# Patient Record
Sex: FEMALE | Race: WHITE | NOT HISPANIC OR LATINO | Employment: OTHER | URBAN - METROPOLITAN AREA
[De-identification: names, ages, dates, MRNs, and addresses within clinical notes are randomized per-mention and may not be internally consistent; named-entity substitution may affect disease eponyms.]

---

## 2023-09-20 PROBLEM — J20.9 BRONCHITIS, ACUTE: Status: RESOLVED | Noted: 2023-09-20 | Resolved: 2023-09-20

## 2023-09-20 PROBLEM — J44.9 COPD (CHRONIC OBSTRUCTIVE PULMONARY DISEASE) (MULTI): Status: ACTIVE | Noted: 2023-09-20

## 2023-09-20 PROBLEM — N76.0 ACUTE VAGINITIS: Status: RESOLVED | Noted: 2023-09-20 | Resolved: 2023-09-20

## 2023-09-20 PROBLEM — J44.1 COPD EXACERBATION (MULTI): Status: ACTIVE | Noted: 2023-09-20

## 2023-09-20 PROBLEM — R91.8 LUNG NODULES: Status: ACTIVE | Noted: 2023-09-20

## 2023-09-20 PROBLEM — R53.81 MALAISE AND FATIGUE: Status: ACTIVE | Noted: 2023-09-20

## 2023-09-20 PROBLEM — N95.1 MENOPAUSAL SYMPTOMS: Status: ACTIVE | Noted: 2023-09-20

## 2023-09-20 PROBLEM — H10.33 ACUTE BACTERIAL CONJUNCTIVITIS OF BOTH EYES: Status: RESOLVED | Noted: 2023-09-20 | Resolved: 2023-09-20

## 2023-09-20 PROBLEM — E78.2 COMBINED HYPERLIPIDEMIA: Status: ACTIVE | Noted: 2023-09-20

## 2023-09-20 PROBLEM — T63.481A INSECT STING: Status: RESOLVED | Noted: 2023-09-20 | Resolved: 2023-09-20

## 2023-09-20 PROBLEM — J01.00 ACUTE NON-RECURRENT MAXILLARY SINUSITIS: Status: RESOLVED | Noted: 2023-09-20 | Resolved: 2023-09-20

## 2023-09-20 PROBLEM — R53.83 MALAISE AND FATIGUE: Status: ACTIVE | Noted: 2023-09-20

## 2023-09-20 PROBLEM — H10.13 ALLERGIC CONJUNCTIVITIS OF BOTH EYES: Status: ACTIVE | Noted: 2023-09-20

## 2023-09-20 PROBLEM — F32.1 MODERATE SINGLE CURRENT EPISODE OF MAJOR DEPRESSIVE DISORDER (MULTI): Status: ACTIVE | Noted: 2023-09-20

## 2023-09-20 PROBLEM — M25.561 ACUTE PAIN OF RIGHT KNEE: Status: RESOLVED | Noted: 2023-09-20 | Resolved: 2023-09-20

## 2023-09-20 PROBLEM — L50.8 URTICARIA, ACUTE: Status: RESOLVED | Noted: 2023-09-20 | Resolved: 2023-09-20

## 2023-09-20 RX ORDER — TRAZODONE HYDROCHLORIDE 100 MG/1
TABLET ORAL
COMMUNITY
Start: 2016-08-26

## 2023-09-20 RX ORDER — CITALOPRAM 20 MG/1
20 TABLET, FILM COATED ORAL DAILY
COMMUNITY
Start: 2019-12-10

## 2023-09-20 RX ORDER — NEOMYCIN SULFATE, POLYMYXIN B SULFATE, AND DEXAMETHASONE 3.5; 10000; 1 MG/G; [USP'U]/G; MG/G
OINTMENT OPHTHALMIC
COMMUNITY
Start: 2021-10-29 | End: 2023-09-21 | Stop reason: ALTCHOICE

## 2023-09-20 RX ORDER — CHOLECALCIFEROL (VITAMIN D3) 25 MCG
25 TABLET ORAL DAILY
COMMUNITY
Start: 2022-04-18

## 2023-09-20 RX ORDER — BUDESONIDE AND FORMOTEROL FUMARATE DIHYDRATE 160; 4.5 UG/1; UG/1
2 AEROSOL RESPIRATORY (INHALATION) 2 TIMES DAILY
COMMUNITY
Start: 2021-06-08 | End: 2023-09-21 | Stop reason: SDUPTHER

## 2023-09-20 RX ORDER — IBUPROFEN 100 MG/5ML
SUSPENSION, ORAL (FINAL DOSE FORM) ORAL
COMMUNITY

## 2023-09-20 RX ORDER — BUPROPION HYDROCHLORIDE 100 MG/1
TABLET, EXTENDED RELEASE ORAL DAILY
COMMUNITY
Start: 2023-03-05 | End: 2024-01-03 | Stop reason: WASHOUT

## 2023-09-20 RX ORDER — ALBUTEROL SULFATE 90 UG/1
AEROSOL, METERED RESPIRATORY (INHALATION)
COMMUNITY
Start: 2019-12-26 | End: 2023-09-21 | Stop reason: SDUPTHER

## 2023-09-21 ENCOUNTER — OFFICE VISIT (OUTPATIENT)
Dept: PRIMARY CARE | Facility: CLINIC | Age: 68
End: 2023-09-21
Payer: MEDICARE

## 2023-09-21 VITALS
SYSTOLIC BLOOD PRESSURE: 100 MMHG | OXYGEN SATURATION: 88 % | HEART RATE: 94 BPM | BODY MASS INDEX: 23.73 KG/M2 | TEMPERATURE: 101.7 F | WEIGHT: 125.6 LBS | DIASTOLIC BLOOD PRESSURE: 56 MMHG

## 2023-09-21 DIAGNOSIS — J44.1 COPD EXACERBATION (MULTI): ICD-10-CM

## 2023-09-21 DIAGNOSIS — F32.1 MODERATE SINGLE CURRENT EPISODE OF MAJOR DEPRESSIVE DISORDER (MULTI): ICD-10-CM

## 2023-09-21 PROCEDURE — 99213 OFFICE O/P EST LOW 20 MIN: CPT | Performed by: FAMILY MEDICINE

## 2023-09-21 PROCEDURE — 1160F RVW MEDS BY RX/DR IN RCRD: CPT | Performed by: FAMILY MEDICINE

## 2023-09-21 PROCEDURE — 1159F MED LIST DOCD IN RCRD: CPT | Performed by: FAMILY MEDICINE

## 2023-09-21 RX ORDER — PREDNISONE 10 MG/1
TABLET ORAL
Qty: 14 TABLET | Refills: 0 | Status: SHIPPED | OUTPATIENT
Start: 2023-09-21 | End: 2023-09-29

## 2023-09-21 RX ORDER — CITALOPRAM 40 MG/1
40 TABLET, FILM COATED ORAL DAILY
COMMUNITY

## 2023-09-21 RX ORDER — BUDESONIDE AND FORMOTEROL FUMARATE DIHYDRATE 160; 4.5 UG/1; UG/1
2 AEROSOL RESPIRATORY (INHALATION) 2 TIMES DAILY
Qty: 1 EACH | Refills: 2 | Status: SHIPPED | OUTPATIENT
Start: 2023-09-21

## 2023-09-21 RX ORDER — ALBUTEROL SULFATE 90 UG/1
AEROSOL, METERED RESPIRATORY (INHALATION)
Qty: 18 G | Refills: 2 | Status: SHIPPED | OUTPATIENT
Start: 2023-09-21

## 2023-09-21 RX ORDER — AZITHROMYCIN 250 MG/1
TABLET, FILM COATED ORAL
Qty: 6 TABLET | Refills: 0 | Status: SHIPPED | OUTPATIENT
Start: 2023-09-21 | End: 2023-09-26

## 2023-09-21 RX ORDER — MULTIVITAMIN
1 TABLET ORAL DAILY
COMMUNITY

## 2023-09-21 ASSESSMENT — ENCOUNTER SYMPTOMS
NAUSEA: 0
UNEXPECTED WEIGHT CHANGE: 0
APPETITE CHANGE: 0
SHORTNESS OF BREATH: 1

## 2023-09-21 NOTE — PROGRESS NOTES
Subjective   Patient ID: Rebekah Murillo is a 67 y.o. female who presents for Shortness of Breath (Pt states SOB started in June and hs gotten worse cough and phlegm thick and greenish low grade fever off and on. No sore throat. Pt states she has been weak and smoked last cigarette Sunday night. ).    Shortness of Breath    Patient says she has not smoked in 5 days now and does not intend to restart  No chest pain but does have shortness of breath and is coughing a lot  Green sputum  No fever chills    Review of Systems   Constitutional:  Negative for appetite change and unexpected weight change.   Eyes:  Negative for visual disturbance.   Respiratory:  Positive for shortness of breath.    Gastrointestinal:  Negative for nausea.       Objective   /56   Pulse 94   Temp 38.7 °C (101.7 °F)   Wt 57 kg (125 lb 9.6 oz)   SpO2 (!) 88%   BMI 23.73 kg/m²     Physical Exam  HENT:      Head: Normocephalic and atraumatic.      Nose: Nose normal.      Mouth/Throat:      Mouth: Mucous membranes are moist.      Pharynx: No oropharyngeal exudate.   Eyes:      Extraocular Movements: Extraocular movements intact.      Conjunctiva/sclera: Conjunctivae normal.      Pupils: Pupils are equal, round, and reactive to light.   Cardiovascular:      Rate and Rhythm: Normal rate and regular rhythm.   Pulmonary:      Effort: Pulmonary effort is normal.      Breath sounds: Wheezing and rhonchi present.   Abdominal:      General: There is no distension.      Palpations: Abdomen is soft.   Musculoskeletal:      Cervical back: Normal range of motion and neck supple.   Lymphadenopathy:      Cervical: No cervical adenopathy.   Neurological:      General: No focal deficit present.      Mental Status: She is alert.   Psychiatric:         Attention and Perception: Attention normal.         Speech: Speech normal.         Behavior: Behavior is cooperative.         Assessment/Plan   Diagnoses and all orders for this visit:  COPD exacerbation  (CMS/[facility])  Comments:  Add medication  Discussed smoking cessation  Encouraged her  to quit smoking as well but he was not receptive  Orders:  -     albuterol (Ventolin HFA) 90 mcg/actuation inhaler; INHALE 1 TO 2 PUFFS EVERY 4 TO 6 HOURS AS NEEDED.  -     budesonide-formoteroL (Symbicort) 160-4.5 mcg/actuation inhaler; Inhale 2 puffs 2 times a day.  -     predniSONE (Deltasone) 10 mg tablet; Take 1 tablet (10 mg) by mouth 3 times a day for 2 days, THEN 1 tablet (10 mg) 2 times a day for 2 days, THEN 1 tablet (10 mg) once daily for 4 days.  -     azithromycin (Zithromax) 250 mg tablet; Take 2 tablets (500 mg) by mouth once daily for 1 day, THEN 1 tablet (250 mg) once daily for 4 days.  -     XR chest 2 views; Future  Moderate single current episode of major depressive disorder (CMS/[facility])  Comments:  Treated and controlled    If getting worse RTC in the next couple of days otherwise RTC in a week if not better

## 2023-09-22 ENCOUNTER — TELEPHONE (OUTPATIENT)
Dept: PRIMARY CARE | Facility: CLINIC | Age: 68
End: 2023-09-22
Payer: MEDICARE

## 2023-09-22 DIAGNOSIS — R91.8 LUNG NODULES: ICD-10-CM

## 2023-09-22 NOTE — TELEPHONE ENCOUNTER
Yaniv Whitehead MD  9/22/2023  3:03 PM EDT       It looks like patient does have some pneumonia in addition there is a question of a mass in the right lower lobe  Please order CT chest no contrast diagnosis lung nodule   Discussed with pt. And she will call for an appt. Order has been placed.

## 2023-09-29 ENCOUNTER — TELEPHONE (OUTPATIENT)
Dept: PRIMARY CARE | Facility: CLINIC | Age: 68
End: 2023-09-29
Payer: MEDICARE

## 2023-09-29 DIAGNOSIS — J18.9 PNEUMONIA OF LEFT LOWER LOBE DUE TO INFECTIOUS ORGANISM: ICD-10-CM

## 2023-09-29 DIAGNOSIS — J18.9 PNEUMONIA OF RIGHT MIDDLE LOBE DUE TO INFECTIOUS ORGANISM: ICD-10-CM

## 2023-09-29 NOTE — TELEPHONE ENCOUNTER
PC with pt. Did see CT results is feeling improved, isn't coughing anything up anymore and hopes that is a good thing encouraged cont use of the inhalers and plenty of fluids will call again early next week with status. Advised CT order is placed for 3 month follow up.

## 2023-10-12 ENCOUNTER — OFFICE VISIT (OUTPATIENT)
Dept: PRIMARY CARE | Facility: CLINIC | Age: 68
End: 2023-10-12
Payer: MEDICARE

## 2023-10-12 VITALS
WEIGHT: 127 LBS | BODY MASS INDEX: 24 KG/M2 | DIASTOLIC BLOOD PRESSURE: 70 MMHG | SYSTOLIC BLOOD PRESSURE: 110 MMHG | HEART RATE: 58 BPM

## 2023-10-12 DIAGNOSIS — Z00.00 ROUTINE GENERAL MEDICAL EXAMINATION AT HEALTH CARE FACILITY: Primary | ICD-10-CM

## 2023-10-12 DIAGNOSIS — J18.9 PNEUMONIA OF LEFT LOWER LOBE DUE TO INFECTIOUS ORGANISM: ICD-10-CM

## 2023-10-12 DIAGNOSIS — R91.8 LUNG NODULES: ICD-10-CM

## 2023-10-12 DIAGNOSIS — J18.9 PNEUMONIA OF RIGHT MIDDLE LOBE DUE TO INFECTIOUS ORGANISM: ICD-10-CM

## 2023-10-12 PROCEDURE — 99213 OFFICE O/P EST LOW 20 MIN: CPT | Performed by: FAMILY MEDICINE

## 2023-10-12 PROCEDURE — 1160F RVW MEDS BY RX/DR IN RCRD: CPT | Performed by: FAMILY MEDICINE

## 2023-10-12 PROCEDURE — G0439 PPPS, SUBSEQ VISIT: HCPCS | Performed by: FAMILY MEDICINE

## 2023-10-12 PROCEDURE — 1170F FXNL STATUS ASSESSED: CPT | Performed by: FAMILY MEDICINE

## 2023-10-12 PROCEDURE — 1159F MED LIST DOCD IN RCRD: CPT | Performed by: FAMILY MEDICINE

## 2023-10-12 ASSESSMENT — PATIENT HEALTH QUESTIONNAIRE - PHQ9
2. FEELING DOWN, DEPRESSED OR HOPELESS: NOT AT ALL
SUM OF ALL RESPONSES TO PHQ9 QUESTIONS 1 AND 2: 0
1. LITTLE INTEREST OR PLEASURE IN DOING THINGS: NOT AT ALL

## 2023-10-12 ASSESSMENT — ACTIVITIES OF DAILY LIVING (ADL)
BATHING: INDEPENDENT
GROCERY_SHOPPING: INDEPENDENT
TAKING_MEDICATION: INDEPENDENT
DOING_HOUSEWORK: INDEPENDENT
DRESSING: INDEPENDENT
MANAGING_FINANCES: INDEPENDENT

## 2023-10-12 NOTE — PROGRESS NOTES
Feeling much better, still weak and tired, but breathing is much better still has to clear her throat a lot.   Has stayed off the cigarettes so far  Subjective   Patient ID: Rebekah Murillo is a 67 y.o. female who presents for Medicare Annual Wellness Visit Subsequent.    HPI   No CP SOB edema  No coughing  Lungs feel better than they have in many decades and has not smoked now in a few weeks  No fever chills    Review of Systems   Constitutional:  Negative for unexpected weight change.   HENT:  Negative for congestion and ear discharge.    Cardiovascular:  Negative for chest pain and palpitations.   Gastrointestinal:  Negative for constipation and vomiting.   All other systems reviewed and are negative.      Objective   /70   Pulse 58   Wt 57.6 kg (127 lb)   PF 97 L/min   BMI 24.00 kg/m²     Physical Exam  HENT:      Head: Normocephalic and atraumatic.      Nose: Nose normal.      Mouth/Throat:      Mouth: Mucous membranes are moist.      Pharynx: No oropharyngeal exudate.   Eyes:      Extraocular Movements: Extraocular movements intact.      Conjunctiva/sclera: Conjunctivae normal.      Pupils: Pupils are equal, round, and reactive to light.   Cardiovascular:      Rate and Rhythm: Normal rate and regular rhythm.   Pulmonary:      Effort: Pulmonary effort is normal.   Abdominal:      General: There is no distension.      Palpations: Abdomen is soft.   Musculoskeletal:      Cervical back: Normal range of motion and neck supple.   Lymphadenopathy:      Cervical: No cervical adenopathy.   Neurological:      General: No focal deficit present.      Mental Status: She is alert.   Psychiatric:         Attention and Perception: Attention normal.         Speech: Speech normal.         Behavior: Behavior is cooperative.         Assessment/Plan   Diagnoses and all orders for this visit:  Routine general medical examination at health care facility  Comments:  MYKE ROUSE discussed  Pneumonia of right middle lobe due to  infectious organism  Comments:  Follow-up CT scan scheduled for December  Pneumonia of left lower lobe due to infectious organism  Lung nodules  Comments:  Encourage patient to remain a non-smoker  Follow-up following CT chest RTC sooner if any issues arise

## 2023-10-13 ASSESSMENT — ENCOUNTER SYMPTOMS
UNEXPECTED WEIGHT CHANGE: 0
VOMITING: 0
CONSTIPATION: 0
PALPITATIONS: 0

## 2023-11-27 PROCEDURE — 88175 CYTOPATH C/V AUTO FLUID REDO: CPT

## 2023-11-27 PROCEDURE — 87624 HPV HI-RISK TYP POOLED RSLT: CPT

## 2023-11-28 ENCOUNTER — LAB REQUISITION (OUTPATIENT)
Dept: LAB | Facility: HOSPITAL | Age: 68
End: 2023-11-28
Payer: MEDICARE

## 2023-11-28 DIAGNOSIS — Z11.51 ENCOUNTER FOR SCREENING FOR HUMAN PAPILLOMAVIRUS (HPV): ICD-10-CM

## 2023-11-28 DIAGNOSIS — Z01.419 ENCOUNTER FOR GYNECOLOGICAL EXAMINATION (GENERAL) (ROUTINE) WITHOUT ABNORMAL FINDINGS: ICD-10-CM

## 2023-12-11 LAB
CYTOLOGY CMNT CVX/VAG CYTO-IMP: NORMAL
HPV HR 12 DNA GENITAL QL NAA+PROBE: NEGATIVE
HPV HR GENOTYPES PNL CVX NAA+PROBE: NEGATIVE
HPV16 DNA SPEC QL NAA+PROBE: NEGATIVE
HPV18 DNA SPEC QL NAA+PROBE: NEGATIVE
LAB AP HPV GENOTYPE QUESTION: YES
LAB AP HPV HR: NORMAL
LABORATORY COMMENT REPORT: NORMAL
PATH REPORT.TOTAL CANCER: NORMAL

## 2023-12-29 ENCOUNTER — HOSPITAL ENCOUNTER (OUTPATIENT)
Dept: RADIOLOGY | Facility: HOSPITAL | Age: 68
Discharge: HOME | End: 2023-12-29
Payer: MEDICARE

## 2023-12-29 DIAGNOSIS — J18.9 PNEUMONIA OF RIGHT MIDDLE LOBE DUE TO INFECTIOUS ORGANISM: ICD-10-CM

## 2023-12-29 DIAGNOSIS — J18.9 PNEUMONIA OF LEFT LOWER LOBE DUE TO INFECTIOUS ORGANISM: ICD-10-CM

## 2023-12-29 DIAGNOSIS — Z12.31 BREAST CANCER SCREENING BY MAMMOGRAM: ICD-10-CM

## 2023-12-29 PROCEDURE — 77067 SCR MAMMO BI INCL CAD: CPT | Performed by: RADIOLOGY

## 2023-12-29 PROCEDURE — 77063 BREAST TOMOSYNTHESIS BI: CPT

## 2023-12-29 PROCEDURE — 77063 BREAST TOMOSYNTHESIS BI: CPT | Performed by: RADIOLOGY

## 2023-12-29 PROCEDURE — 71250 CT THORAX DX C-: CPT

## 2023-12-29 PROCEDURE — 71250 CT THORAX DX C-: CPT | Performed by: RADIOLOGY

## 2024-01-02 ENCOUNTER — TELEMEDICINE CLINICAL SUPPORT (OUTPATIENT)
Dept: PRIMARY CARE | Facility: CLINIC | Age: 69
End: 2024-01-02
Payer: MEDICARE

## 2024-01-02 DIAGNOSIS — R09.81 SINUS CONGESTION: Primary | ICD-10-CM

## 2024-01-03 ENCOUNTER — OFFICE VISIT (OUTPATIENT)
Dept: PRIMARY CARE | Facility: CLINIC | Age: 69
End: 2024-01-03
Payer: MEDICARE

## 2024-01-03 VITALS
OXYGEN SATURATION: 96 % | DIASTOLIC BLOOD PRESSURE: 64 MMHG | HEIGHT: 61 IN | WEIGHT: 130.8 LBS | BODY MASS INDEX: 24.7 KG/M2 | TEMPERATURE: 99.7 F | SYSTOLIC BLOOD PRESSURE: 100 MMHG | HEART RATE: 83 BPM

## 2024-01-03 DIAGNOSIS — J01.00 ACUTE MAXILLARY SINUSITIS, RECURRENCE NOT SPECIFIED: ICD-10-CM

## 2024-01-03 DIAGNOSIS — J44.1 COPD EXACERBATION (MULTI): Primary | ICD-10-CM

## 2024-01-03 DIAGNOSIS — F32.1 MODERATE SINGLE CURRENT EPISODE OF MAJOR DEPRESSIVE DISORDER (MULTI): ICD-10-CM

## 2024-01-03 DIAGNOSIS — B37.83 CANDIDAL CHEILITIS: ICD-10-CM

## 2024-01-03 DIAGNOSIS — J43.1 PANLOBULAR EMPHYSEMA (MULTI): ICD-10-CM

## 2024-01-03 PROCEDURE — 99213 OFFICE O/P EST LOW 20 MIN: CPT | Performed by: FAMILY MEDICINE

## 2024-01-03 PROCEDURE — 1159F MED LIST DOCD IN RCRD: CPT | Performed by: FAMILY MEDICINE

## 2024-01-03 PROCEDURE — 1160F RVW MEDS BY RX/DR IN RCRD: CPT | Performed by: FAMILY MEDICINE

## 2024-01-03 PROCEDURE — 1036F TOBACCO NON-USER: CPT | Performed by: FAMILY MEDICINE

## 2024-01-03 RX ORDER — AZITHROMYCIN 250 MG/1
TABLET, FILM COATED ORAL
Qty: 6 TABLET | Refills: 0 | Status: SHIPPED | OUTPATIENT
Start: 2024-01-03 | End: 2024-01-07

## 2024-01-03 RX ORDER — NYSTATIN 100000 U/G
CREAM TOPICAL 2 TIMES DAILY
Qty: 30 G | Refills: 0 | Status: SHIPPED | OUTPATIENT
Start: 2024-01-03 | End: 2024-01-10

## 2024-01-03 RX ORDER — PREDNISONE 10 MG/1
TABLET ORAL
Qty: 14 TABLET | Refills: 0 | Status: SHIPPED | OUTPATIENT
Start: 2024-01-03 | End: 2024-01-11

## 2024-01-03 RX ORDER — BUPROPION HYDROCHLORIDE 150 MG/1
150 TABLET ORAL
COMMUNITY
Start: 2023-12-28

## 2024-01-03 ASSESSMENT — PATIENT HEALTH QUESTIONNAIRE - PHQ9
1. LITTLE INTEREST OR PLEASURE IN DOING THINGS: NOT AT ALL
2. FEELING DOWN, DEPRESSED OR HOPELESS: NOT AT ALL
SUM OF ALL RESPONSES TO PHQ9 QUESTIONS 1 AND 2: 0

## 2024-01-03 ASSESSMENT — COLUMBIA-SUICIDE SEVERITY RATING SCALE - C-SSRS
6. HAVE YOU EVER DONE ANYTHING, STARTED TO DO ANYTHING, OR PREPARED TO DO ANYTHING TO END YOUR LIFE?: NO
2. HAVE YOU ACTUALLY HAD ANY THOUGHTS OF KILLING YOURSELF?: NO
1. IN THE PAST MONTH, HAVE YOU WISHED YOU WERE DEAD OR WISHED YOU COULD GO TO SLEEP AND NOT WAKE UP?: NO

## 2024-01-03 ASSESSMENT — ENCOUNTER SYMPTOMS
UNEXPECTED WEIGHT CHANGE: 0
APPETITE CHANGE: 0
SORE THROAT: 1
COUGH: 1
NAUSEA: 0

## 2024-01-03 NOTE — PROGRESS NOTES
"Subjective   Patient ID: Rebekah Murillo is a 68 y.o. female who presents for Cough (For about 6 days), Sore Throat, and Nasal Congestion (With yellow/greenish phlegm).    Cough  Associated symptoms include a sore throat.   Sore Throat   Associated symptoms include coughing.      Patient is seen in the office complaining of sinus congestion.  Complains of stuffed up head, drainage, cough.  Patient complains symptoms are moderate and patient feels mildly ill.  The symptoms are improved with OTC medication.  Patient also notes associated fatigue.   Complains of lower lip being red and cracked tender since sick  Review of Systems   Constitutional:  Negative for appetite change and unexpected weight change.   HENT:  Positive for sore throat.    Eyes:  Negative for visual disturbance.   Respiratory:  Positive for cough.    Gastrointestinal:  Negative for nausea.       Objective   /64   Pulse 83   Temp 37.6 °C (99.7 °F)   Ht 1.549 m (5' 1\")   Wt 59.3 kg (130 lb 12.8 oz)   SpO2 96%   BMI 24.71 kg/m²     Physical Exam  HENT:      Head: Normocephalic and atraumatic.      Nose: Nose normal.      Mouth/Throat:      Mouth: Mucous membranes are moist.      Pharynx: No oropharyngeal exudate.   Eyes:      Extraocular Movements: Extraocular movements intact.      Conjunctiva/sclera: Conjunctivae normal.      Pupils: Pupils are equal, round, and reactive to light.   Cardiovascular:      Rate and Rhythm: Normal rate and regular rhythm.   Pulmonary:      Effort: Pulmonary effort is normal.      Breath sounds: Wheezing and rhonchi present.   Abdominal:      General: There is no distension.      Palpations: Abdomen is soft.   Musculoskeletal:      Cervical back: Normal range of motion and neck supple.   Lymphadenopathy:      Cervical: No cervical adenopathy.   Neurological:      General: No focal deficit present.      Mental Status: She is alert.   Psychiatric:         Attention and Perception: Attention normal.         " Speech: Speech normal.         Behavior: Behavior is cooperative.     Lower lip is slightly erythematous and cracked    Assessment/Plan   Diagnoses and all orders for this visit:  COPD exacerbation (CMS/[facility])  Comments:  risk benefits discussed  start medication  continue to be a non smoker!  Orders:  -     azithromycin (Zithromax) 250 mg tablet; Take 2 tablets (500 mg) by mouth once daily for 1 day, THEN 1 tablet (250 mg) once daily for 4 days.  -     predniSONE (Deltasone) 10 mg tablet; Take 1 tablet (10 mg) by mouth 3 times a day for 2 days, THEN 1 tablet (10 mg) 2 times a day for 2 days, THEN 1 tablet (10 mg) once daily for 4 days.  Panlobular emphysema (CMS/HCC)  Moderate single current episode of major depressive disorder (CMS/[facility])  Comments:  seeing psychiatry  Acute maxillary sinusitis, recurrence not specified  Comments:  Risk benefits discussed and medication as directed  Supportive care  Orders:  -     azithromycin (Zithromax) 250 mg tablet; Take 2 tablets (500 mg) by mouth once daily for 1 day, THEN 1 tablet (250 mg) once daily for 4 days.  -     predniSONE (Deltasone) 10 mg tablet; Take 1 tablet (10 mg) by mouth 3 times a day for 2 days, THEN 1 tablet (10 mg) 2 times a day for 2 days, THEN 1 tablet (10 mg) once daily for 4 days.  Candidal cheilitis  Comments:  Trial of nystatin cream  Orders:  -     nystatin (Mycostatin) cream; Apply topically 2 times a day for 7 days. apply to affected area    RTC 1 week if not better sooner if worse      Spontaneous, unlabored and symmetrical

## 2024-01-11 ENCOUNTER — TELEPHONE (OUTPATIENT)
Dept: PRIMARY CARE | Facility: CLINIC | Age: 69
End: 2024-01-11
Payer: MEDICARE

## 2024-01-11 DIAGNOSIS — R91.8 LUNG NODULES: ICD-10-CM

## 2024-01-11 NOTE — TELEPHONE ENCOUNTER
----- Message from Yaniv Whitehead MD sent at 1/9/2024  8:14 AM EST -----  Regarding: FW: Scan recommendation from Lung cancer screening  Please place order for repeat CT chest no contrast early April 2024  ----- Message -----  From: Nancy Hoover RN  Sent: 1/8/2024   6:28 PM EST  To: Yaniv Whitehead MD  Subject: RE: Scan recommendation from Lung cancer scr#    Unfortunately, I have no ability to place the order for you.   ----- Message -----  From: Yaniv Whitehead MD  Sent: 1/8/2024   4:29 PM EST  To: Nancy Hoover RN  Subject: RE: Scan recommendation from Lung cancer scr#    Please order a repeat CT without IV contrast in 3 months due to lung nodules  ----- Message -----  From: Nancy Hoover RN  Sent: 1/8/2024   3:06 PM EST  To: Yaniv Whitehead MD  Subject: Scan recommendation from Lung cancer screeni#    Your patient's Chest CT from 12/29/23, was reviewed at the weekly Lung Cancer Screening (LCS) Multidisciplinary Team Conference for program re-alignment. This scan is being called an LR0. It is recommended that the patient:      ·Have a CT lung screening follow up CT wo IV contrast (MNL859) in 3 months 9approx. 3/29/24),  to monitor these findings.     Thank you in advance for your time and effort on this patient's behalf.  Please let us know if you have any questions or comments.     Best regards,     The Lung Cancer Screening Program  Tele. [phone]   [email or web address]

## 2024-05-01 ENCOUNTER — TELEMEDICINE (OUTPATIENT)
Dept: PRIMARY CARE | Facility: CLINIC | Age: 69
End: 2024-05-01
Payer: MEDICARE

## 2024-05-01 DIAGNOSIS — S29.012A MUSCLE STRAIN OF UPPER BACK: Primary | ICD-10-CM

## 2024-05-01 PROCEDURE — 99213 OFFICE O/P EST LOW 20 MIN: CPT | Performed by: NURSE PRACTITIONER

## 2024-05-01 PROCEDURE — 1160F RVW MEDS BY RX/DR IN RCRD: CPT | Performed by: NURSE PRACTITIONER

## 2024-05-01 PROCEDURE — 1159F MED LIST DOCD IN RCRD: CPT | Performed by: NURSE PRACTITIONER

## 2024-05-01 RX ORDER — TIZANIDINE 2 MG/1
2 TABLET ORAL EVERY 8 HOURS PRN
Qty: 30 TABLET | Refills: 0 | Status: SHIPPED | OUTPATIENT
Start: 2024-05-01 | End: 2024-05-10 | Stop reason: SDUPTHER

## 2024-05-01 ASSESSMENT — ENCOUNTER SYMPTOMS
WEAKNESS: 0
BACK PAIN: 1
DIZZINESS: 0
MYALGIAS: 1
NAUSEA: 0
FEVER: 0
TINGLING: 0
ACTIVITY CHANGE: 0
PALPITATIONS: 0
APPETITE CHANGE: 0
SHORTNESS OF BREATH: 0
LIGHT-HEADEDNESS: 0
DIARRHEA: 0
NUMBNESS: 0
VOMITING: 0
HEADACHES: 0
CHEST TIGHTNESS: 0
FATIGUE: 0

## 2024-05-01 NOTE — PROGRESS NOTES
Subjective   Patient ID: Rebekah Murillo is a 68 y.o. female who presents for Back Pain.    Back pain  Strained back looking in her closet for an item  Back pain has worsened throughout last week  Certain movement causes increased pain.  Was treating with heat it did help temporarily  Today tried biofreeze and has had back spasm  Pain is to middle back      Back Pain  This is a new problem. The current episode started in the past 7 days. The problem has been gradually worsening since onset. The pain is present in the thoracic spine. The quality of the pain is described as aching, cramping and stabbing. The pain is at a severity of 6/10. The symptoms are aggravated by bending and position. Stiffness is present All day. Pertinent negatives include no chest pain, fever, headaches, numbness, tingling or weakness. Risk factors include menopause. She has tried analgesics for the symptoms. The treatment provided mild relief.        Review of Systems   Constitutional:  Negative for activity change, appetite change, fatigue and fever.   Respiratory:  Negative for chest tightness and shortness of breath.    Cardiovascular:  Negative for chest pain, palpitations and leg swelling.   Gastrointestinal:  Negative for diarrhea, nausea and vomiting.   Musculoskeletal:  Positive for back pain and myalgias.   Skin: Negative.    Neurological:  Negative for dizziness, tingling, weakness, light-headedness, numbness and headaches.       Objective   There were no vitals taken for this visit.    Physical Exam  Constitutional:       General: She is not in acute distress.     Appearance: She is not ill-appearing.      Comments: On Demand Virtual Visit Patient Consent     An interactive audio and video telecommunication system which permits real time communications between the patient (at the originating site) and provider (at the distant site) was utilized to provide this telehealth service.   Verbal consent was requested and obtained from  Rebekah Murillo (or parent if under 18) on this date, 03/27/24 for a telehealth visit.   I have verbally confirmed with Rebekah Murillo (or parent if under 18) that they are physically located in the [facility] during this virtual visit.    Telemedicine appropriate evaluation completed.  Unable to perform complete physical exam due to virtual visit, patient was visualized on interactive video.      Pulmonary:      Effort: Pulmonary effort is normal.   Neurological:      Mental Status: She is alert and oriented to person, place, and time.         Assessment/Plan   Diagnoses and all orders for this visit:  Muscle strain of upper back  -     tiZANidine (Zanaflex) 2 mg tablet; Take 1 tablet (2 mg) by mouth every 8 hours if needed for muscle spasms for up to 21 days.  May take OTC pain relievers as needed  Apply heat and perform slow stretching  Follow up with PCP if symptoms persist or worsen

## 2024-05-10 ENCOUNTER — HOSPITAL ENCOUNTER (OUTPATIENT)
Dept: RADIOLOGY | Facility: CLINIC | Age: 69
Discharge: HOME | End: 2024-05-10
Payer: MEDICARE

## 2024-05-10 ENCOUNTER — OFFICE VISIT (OUTPATIENT)
Dept: PRIMARY CARE | Facility: CLINIC | Age: 69
End: 2024-05-10
Payer: MEDICARE

## 2024-05-10 VITALS
OXYGEN SATURATION: 97 % | HEART RATE: 75 BPM | SYSTOLIC BLOOD PRESSURE: 128 MMHG | BODY MASS INDEX: 25.86 KG/M2 | DIASTOLIC BLOOD PRESSURE: 78 MMHG | HEIGHT: 61 IN | WEIGHT: 137 LBS

## 2024-05-10 DIAGNOSIS — S29.012A MUSCLE STRAIN OF UPPER BACK: ICD-10-CM

## 2024-05-10 DIAGNOSIS — R07.89 RIGHT-SIDED CHEST WALL PAIN: Primary | ICD-10-CM

## 2024-05-10 DIAGNOSIS — R07.89 RIGHT-SIDED CHEST WALL PAIN: ICD-10-CM

## 2024-05-10 PROCEDURE — 1036F TOBACCO NON-USER: CPT | Performed by: FAMILY MEDICINE

## 2024-05-10 PROCEDURE — 1159F MED LIST DOCD IN RCRD: CPT | Performed by: FAMILY MEDICINE

## 2024-05-10 PROCEDURE — 71101 X-RAY EXAM UNILAT RIBS/CHEST: CPT | Mod: RIGHT SIDE | Performed by: RADIOLOGY

## 2024-05-10 PROCEDURE — 99214 OFFICE O/P EST MOD 30 MIN: CPT | Performed by: FAMILY MEDICINE

## 2024-05-10 PROCEDURE — 71101 X-RAY EXAM UNILAT RIBS/CHEST: CPT | Mod: RT

## 2024-05-10 PROCEDURE — 1160F RVW MEDS BY RX/DR IN RCRD: CPT | Performed by: FAMILY MEDICINE

## 2024-05-10 RX ORDER — TIZANIDINE 4 MG/1
4 TABLET ORAL EVERY 6 HOURS PRN
Qty: 30 TABLET | Refills: 0 | Status: SHIPPED | OUTPATIENT
Start: 2024-05-10 | End: 2024-05-10

## 2024-05-10 RX ORDER — TIZANIDINE 4 MG/1
4 TABLET ORAL EVERY 6 HOURS PRN
Qty: 60 TABLET | Refills: 0 | Status: SHIPPED | OUTPATIENT
Start: 2024-05-10

## 2024-05-10 RX ORDER — DICLOFENAC SODIUM 75 MG/1
75 TABLET, DELAYED RELEASE ORAL 2 TIMES DAILY PRN
Qty: 60 TABLET | Refills: 0 | Status: SHIPPED | OUTPATIENT
Start: 2024-05-10 | End: 2024-06-11 | Stop reason: SDUPTHER

## 2024-05-10 ASSESSMENT — ENCOUNTER SYMPTOMS
BLOOD IN STOOL: 0
COUGH: 0
WEAKNESS: 0
FEVER: 0
UNEXPECTED WEIGHT CHANGE: 0
ABDOMINAL PAIN: 0
DIARRHEA: 0
LIGHT-HEADEDNESS: 0
CONFUSION: 0
DYSURIA: 0
CONSTIPATION: 1
TROUBLE SWALLOWING: 0
SHORTNESS OF BREATH: 0
NUMBNESS: 0
CHILLS: 0
NAUSEA: 0
WHEEZING: 0
BACK PAIN: 1
DIFFICULTY URINATING: 0
DIZZINESS: 0
VOMITING: 0

## 2024-05-10 ASSESSMENT — COLUMBIA-SUICIDE SEVERITY RATING SCALE - C-SSRS
1. IN THE PAST MONTH, HAVE YOU WISHED YOU WERE DEAD OR WISHED YOU COULD GO TO SLEEP AND NOT WAKE UP?: NO
6. HAVE YOU EVER DONE ANYTHING, STARTED TO DO ANYTHING, OR PREPARED TO DO ANYTHING TO END YOUR LIFE?: NO
2. HAVE YOU ACTUALLY HAD ANY THOUGHTS OF KILLING YOURSELF?: NO

## 2024-05-10 ASSESSMENT — PATIENT HEALTH QUESTIONNAIRE - PHQ9
SUM OF ALL RESPONSES TO PHQ9 QUESTIONS 1 AND 2: 0
1. LITTLE INTEREST OR PLEASURE IN DOING THINGS: NOT AT ALL
2. FEELING DOWN, DEPRESSED OR HOPELESS: NOT AT ALL

## 2024-05-10 NOTE — PATIENT INSTRUCTIONS
Try cold compresses and stopping the warm compresses. Try Diclofenac instead of Ibuprofen. Try increasing the Tizanidine (be cautious about drowsiness).       Please return or seek medical attention if symptoms persist, change, worsen, or return. For emergencies, call 9-1-1 or go to the nearest Emergency Room.    Avoid taking Biotin for a week prior to any blood tests, as it can interfere with certain results. Fasting for labs means 12 hours, nothing to eat or drink, except water and medications, unless directed otherwise.    For assistance with scheduling referrals or consultations, please call [phone]. For laboratory, radiology, and other tests, please call [phone] ([phone] for pediatrics). Please review prescription inserts and published information for possible adverse effects of all medications. Return after testing or consultation to review results and recommendations, if symptoms persist, change, worsen, or return, or if you have any question or concern. If you do not get results within 7-10 days, or you have any question or concern, please send a message, call the office ([phone]), or return to the office for a follow-up appointment. For non-emergencies, you may call the office. For emergencies, call 9-1-1 or go to the nearest Emergency Department. Please schedule additional appointment(s) to address concern(s) not addressed today.    In general, results are not released or discussed over the telephone, but at an appointment or via  [employer]. Results of tests done through [facility] are released via  [employer] (see below).  [email or web address]   [employer] support line: [phone]

## 2024-05-10 NOTE — PROGRESS NOTES
"Subjective   Patient ID: Rebekah Murillo is a 68 y.o. female who presents for Rib Injury (3 weeks ago pt leaned over a washing machine and hit her rib on the washer machine then 1 week ago pt leaned over and felt something pull pt also did a telemed appt on 5/11) and Constipation.  HPI Historian(s): Self    4-25 was bending and reaching low, searching for something in a closet/cabinet. Olema a sudden sharp pain across her mid back, \"like oops, you stretched to far.\" 5-1 telemed, started a muscle relaxer, helps for a few hours. Tried Advil, Tylenol, these also help for a few hours.    c/o constipation, strains to go, sometimes has to manually disimpact herself with a gloved hand.    Review of Systems   Constitutional:  Negative for chills, fever and unexpected weight change.   HENT:  Negative for ear pain and trouble swallowing.    Respiratory:  Negative for cough, shortness of breath and wheezing.    Cardiovascular:  Negative for chest pain.   Gastrointestinal:  Positive for constipation. Negative for abdominal pain, blood in stool, diarrhea, nausea and vomiting.   Genitourinary:  Negative for difficulty urinating and dysuria.   Musculoskeletal:  Positive for back pain.   Skin:  Negative for rash.   Neurological:  Negative for dizziness, syncope, weakness, light-headedness and numbness.   Psychiatric/Behavioral:  Negative for behavioral problems and confusion.          Objective   /78   Pulse 75   Ht 1.549 m (5' 1\")   Wt 62.1 kg (137 lb)   SpO2 97%   BMI 25.89 kg/m²     Physical Exam  Vitals and nursing note reviewed.   Constitutional:       General: She is not in acute distress.     Appearance: Normal appearance.      Comments: No assistive device presently being used.   HENT:      Head: Normocephalic and atraumatic.   Eyes:      General: No scleral icterus.     Extraocular Movements: Extraocular movements intact.      Conjunctiva/sclera: Conjunctivae normal.   Pulmonary:      Effort: Pulmonary effort is " normal. No respiratory distress.   Chest:      Chest wall: Tenderness (lower right ribs, intercostals, and serratus anterior) present.       Skin:     General: Skin is warm and dry.      Coloration: Skin is not jaundiced.   Neurological:      Mental Status: She is alert and oriented to person, place, and time. Mental status is at baseline.   Psychiatric:         Behavior: Behavior normal.         Assessment/Plan   Problem List Items Addressed This Visit       Right-sided chest wall pain - Primary     Muscle strains. NSAIDs, increase Tizanidine, r/o fracture.         Relevant Medications    diclofenac (Voltaren) 75 mg EC tablet    tiZANidine (Zanaflex) 4 mg tablet    Other Relevant Orders    XR ribs right 2 views w chest pa or ap     Other Visit Diagnoses       Muscle strain of upper back

## 2024-05-13 ENCOUNTER — TELEPHONE (OUTPATIENT)
Dept: PRIMARY CARE | Facility: CLINIC | Age: 69
End: 2024-05-13
Payer: MEDICARE

## 2024-05-29 ENCOUNTER — TELEPHONE (OUTPATIENT)
Dept: PRIMARY CARE | Facility: CLINIC | Age: 69
End: 2024-05-29
Payer: MEDICARE

## 2024-05-29 DIAGNOSIS — S22.060A COMPRESSION FRACTURE OF T7 VERTEBRA, INITIAL ENCOUNTER (MULTI): ICD-10-CM

## 2024-05-29 DIAGNOSIS — S32.011A CLOSED STABLE BURST FRACTURE OF FIRST LUMBAR VERTEBRA, INITIAL ENCOUNTER (MULTI): ICD-10-CM

## 2024-05-29 NOTE — TELEPHONE ENCOUNTER
Pt called from [place], she has had a CT and an MRI.  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  She is currently in a back brace, but they are not going to continue to treat.  They would like her to follow-up with Neurologist here when she comes home, round June 6th.  They are working on getting all the notes and results faxed to Dr LAYTON, and would like to see if he can help with referral and appointment setting?

## 2024-05-30 NOTE — TELEPHONE ENCOUNTER
Had to leave a message at Dr Thacker office [phone] Referral placed, contacted pt to let her know. Is c/o constipation due to pain meds has been taking Miralax up to twice a day and still hasn't gone. Suggested Magnesium Citrate will try this. Also they gave her Hydrocodone/ APAP  5/325 mg 1 tablet q 6 hours as needed for mild pain for up to 3 days # 12 given and no refill  she and her newly graduated RN mynor looked it up and read this is a low dose and didn't think it would hurt to take 2 tablets, she won't do it all the time, but it did help the pain. Wondering if you would send another script to the [facility] AUDRA Colon (on the chart) She won't be home until the 6th of June and doesn't think 12 tabs will last her that was given in the ER. Discouraged any increase in dosing and advised I wasn't sure it would be possible for us to prescribe controlled substance. Please advise.

## 2024-06-04 RX ORDER — HYDROCODONE BITARTRATE AND ACETAMINOPHEN 5; 325 MG/1; MG/1
1 TABLET ORAL EVERY 6 HOURS PRN
Qty: 20 TABLET | Refills: 0 | Status: SHIPPED | OUTPATIENT
Start: 2024-06-04 | End: 2024-06-11 | Stop reason: SDUPTHER

## 2024-06-04 NOTE — TELEPHONE ENCOUNTER
Hung at Dr Thacker office called they can see her in [place] 6/11/24 at 10 am [facility] [address] Pt notified is going to run out of Hydrocodone hoping Dr. Whitehead would send in a short term supply doesn't think she can do without as she is driving home in  a car and is pretty intolerable is taking 4 a day sometimes more  Per JU will send in # 20 for 5 days and she will have to make this work.

## 2024-06-04 NOTE — TELEPHONE ENCOUNTER
I have personally reviewed the OARRS report for this patient. I have considered the risks of abuse, dependence, addiction and diversion.

## 2024-06-06 ENCOUNTER — TELEPHONE (OUTPATIENT)
Dept: PRIMARY CARE | Facility: CLINIC | Age: 69
End: 2024-06-06
Payer: MEDICARE

## 2024-06-06 NOTE — TELEPHONE ENCOUNTER
PC from pt. Did not receive the disc with her images from the [facility] she has spoken to medical records there and they never mailed it says we have to call to send them a release to send the images through the system.  [phone] opt 1 and opt 1 she spoke with Luís and Alissa Fax # [phone] Spoke with Luís says they need a release from us to them sent through  PACS imaging to have them released electronically. Advised they had already received a release from the pt to send all her records here, She said it has to come through PACS I have no idea how to send a release in this manner. PC to radiology , they got back to me and said they need to contact our imaging library at [phone] and they should be able to help them connect. Called Luís back and she said she just spoke with them and they are unable to connect. She will put the disc in the mail and hopefully it will get her by Monday for an appt on Tues.   Pt notified asked her to call here Mon afternoon to see if it has been received in the mail if not she should contact Dr Thacker office to see what they want her to do. PH # and address for his office given and she agreed.

## 2024-06-07 NOTE — PROGRESS NOTES
Chief Complaint:   Rebekah Murillo is a 68 y.o. woman here for lumbar burst fracture    HPI  Rebekah Murillo has known osteoporosis, is only taking vit d and ca. She was in the airport in [place] when she bent over to zip her suitcase and felt a severe sudden onset of back pain.  She ended up going to get this evaluated in [place] and CT and MRI of the spine was done.  This showed a L1 burst fracture.  She has no numbness nor weakness in her legs.  She continues to have severe back pain.  She has no bowel/bladder issues.  She is wearing a thoracolumbar orthotic brace, which she feels like helps with her pain.  She is taking hydrocodone and diclofenac which when taken together helps somewhat with her pain as well.    Review of Systems  All other systems reviewed and negative other than what is already stated in this note.      Past Medical History:   Diagnosis Date    Acute bacterial conjunctivitis of both eyes 2023    Acute non-recurrent maxillary sinusitis 2023    Acute pain of right knee 2023    Bronchitis, acute 2023    Urticaria, acute 2023       Patient Active Problem List   Diagnosis    Allergic conjunctivitis of both eyes    Combined hyperlipidemia    COPD (chronic obstructive pulmonary disease) (Multi)    COPD exacerbation (Multi)    Lung nodules    Malaise and fatigue    Menopausal symptoms    Moderate single current episode of major depressive disorder (Multi)    Right-sided chest wall pain       No past surgical history on file.    No family history on file.    Social History     Tobacco Use    Smoking status: Former     Current packs/day: 0.00     Average packs/day: 0.3 packs/day for 50.0 years (12.5 ttl pk-yrs)     Types: Cigarettes     Start date: 1973     Quit date: 2023     Years since quittin.7    Smokeless tobacco: Never   Vaping Use    Vaping status: Never Used   Substance Use Topics    Alcohol use: Yes     Comment: ocassional    Drug use:  Never         Current Outpatient Medications:     albuterol (Ventolin HFA) 90 mcg/actuation inhaler, INHALE 1 TO 2 PUFFS EVERY 4 TO 6 HOURS AS NEEDED., Disp: 18 g, Rfl: 2    ascorbic acid (Vitamin C) 1,000 mg tablet, [facility] Vitamin C 1000 MG Oral Tablet  Refills: 0     Active, Disp: , Rfl:     budesonide-formoteroL (Symbicort) 160-4.5 mcg/actuation inhaler, Inhale 2 puffs 2 times a day., Disp: 1 each, Rfl: 2    buPROPion XL (Wellbutrin XL) 150 mg 24 hr tablet, Take 1 tablet (150 mg) by mouth once daily in the morning. Take before meals., Disp: , Rfl:     calcium carbonate (CALCIUM 600 ORAL), Take by mouth., Disp: , Rfl:     cholecalciferol (Vitamin D-3) 25 MCG (1000 UT) tablet, Take 1 tablet (25 mcg) by mouth once daily., Disp: , Rfl:     citalopram (CeleXA) 40 mg tablet, Take 1 tablet (40 mg) by mouth once daily., Disp: , Rfl:     diclofenac (Voltaren) 75 mg EC tablet, Take 1 tablet (75 mg) by mouth 2 times a day as needed (pain). Do not crush, chew, or split., Disp: 60 tablet, Rfl: 0    multivitamin tablet, Take 1 tablet by mouth once daily., Disp: , Rfl:     traZODone (Desyrel) 100 mg tablet, TAKE 2 TABLET Bedtime, Disp: , Rfl:     citalopram (CeleXA) 20 mg tablet, Take 1 tablet (20 mg) by mouth once daily., Disp: , Rfl:     tiZANidine (Zanaflex) 4 mg tablet, Take 1 tablet (4 mg) by mouth every 6 hours if needed for muscle spasms., Disp: 60 tablet, Rfl: 0        Objective   Vitals:    06/11/24 1020   BP: 107/69   Pulse: 67   Temp: 36.2 °C (97.2 °F)       no acute distress, well developed woman appearing her  stated age  normal sclera  moist mucus membranes  no peripheral edema   symmetric chest rise  nondistended abdomen  alert and oriented, pupils equal and round, extraocular movements intact, full strength in all extremities, normal sensation to light touch throughout, normal symmetric reflexes, no dysmetria  normal mood      Assessment/Plan   I personally reviewed MRI of the lumbar spine with and without  contrast, CT of the lumbar spine done on 5/28/2024.  I also ordered and personally reviewed upright x-rays of the lumbar spine done today.  There is L1 burst fracture with minimal retropulsion of bone.  There is increased loss of height and kyphosis when comparing the new x-rays to prior MRI and CT scan.  There is no abnormal enhancement on the MRI of the lumbar spine.  She has known osteoporosis and this is clearly a pathologic fragility fracture.  She needs aggressive treatment for her osteoporosis.  I referred her to rheumatology for this.  I also referred her to Dr. Coe in pain management.  She does not need any spine surgery at this time and is at very high risk of hardware failure and fracture with any sort of spinal instrumentation.    Mahendra Luke MD

## 2024-06-11 ENCOUNTER — HOSPITAL ENCOUNTER (OUTPATIENT)
Dept: RADIOLOGY | Facility: CLINIC | Age: 69
Discharge: HOME | End: 2024-06-11
Payer: MEDICARE

## 2024-06-11 ENCOUNTER — OFFICE VISIT (OUTPATIENT)
Dept: NEUROSURGERY | Facility: CLINIC | Age: 69
End: 2024-06-11
Payer: MEDICARE

## 2024-06-11 VITALS
DIASTOLIC BLOOD PRESSURE: 69 MMHG | WEIGHT: 130 LBS | TEMPERATURE: 97.2 F | SYSTOLIC BLOOD PRESSURE: 107 MMHG | BODY MASS INDEX: 24.55 KG/M2 | HEIGHT: 61 IN | HEART RATE: 67 BPM

## 2024-06-11 DIAGNOSIS — S22.060A COMPRESSION FRACTURE OF T7 VERTEBRA, INITIAL ENCOUNTER (MULTI): ICD-10-CM

## 2024-06-11 DIAGNOSIS — S32.011A CLOSED STABLE BURST FRACTURE OF FIRST LUMBAR VERTEBRA, INITIAL ENCOUNTER (MULTI): ICD-10-CM

## 2024-06-11 DIAGNOSIS — S32.001A CLOSED BURST FRACTURE OF LUMBAR VERTEBRA, INITIAL ENCOUNTER (MULTI): ICD-10-CM

## 2024-06-11 DIAGNOSIS — M81.0 OSTEOPOROSIS, UNSPECIFIED OSTEOPOROSIS TYPE, UNSPECIFIED PATHOLOGICAL FRACTURE PRESENCE: Primary | ICD-10-CM

## 2024-06-11 DIAGNOSIS — R07.89 RIGHT-SIDED CHEST WALL PAIN: ICD-10-CM

## 2024-06-11 PROCEDURE — 1159F MED LIST DOCD IN RCRD: CPT | Performed by: NEUROLOGICAL SURGERY

## 2024-06-11 PROCEDURE — 1125F AMNT PAIN NOTED PAIN PRSNT: CPT | Performed by: NEUROLOGICAL SURGERY

## 2024-06-11 PROCEDURE — 72100 X-RAY EXAM L-S SPINE 2/3 VWS: CPT | Performed by: RADIOLOGY

## 2024-06-11 PROCEDURE — 99205 OFFICE O/P NEW HI 60 MIN: CPT | Performed by: NEUROLOGICAL SURGERY

## 2024-06-11 PROCEDURE — 1036F TOBACCO NON-USER: CPT | Performed by: NEUROLOGICAL SURGERY

## 2024-06-11 PROCEDURE — 72100 X-RAY EXAM L-S SPINE 2/3 VWS: CPT

## 2024-06-11 PROCEDURE — 99215 OFFICE O/P EST HI 40 MIN: CPT | Performed by: NEUROLOGICAL SURGERY

## 2024-06-11 RX ORDER — HYDROCODONE BITARTRATE AND ACETAMINOPHEN 5; 325 MG/1; MG/1
1 TABLET ORAL EVERY 6 HOURS PRN
Qty: 20 TABLET | Refills: 0 | Status: SHIPPED | OUTPATIENT
Start: 2024-06-11 | End: 2024-06-16

## 2024-06-11 RX ORDER — DICLOFENAC SODIUM 75 MG/1
75 TABLET, DELAYED RELEASE ORAL 2 TIMES DAILY PRN
Qty: 60 TABLET | Refills: 0 | Status: SHIPPED | OUTPATIENT
Start: 2024-06-11

## 2024-06-11 ASSESSMENT — PAIN SCALES - GENERAL: PAINLEVEL: 7

## 2024-06-11 NOTE — TELEPHONE ENCOUNTER
Pt calling she is in need of refills.    Norco 5/325mg  Use as directed   Patient only has 1 pill left    Diclofenac 75mg  1 PO every day  90 days    WM Leiva    The neurosurgeon wants her on Forteo for her osteoporosis and they told her to call us and have it sent in right away. She is aware that JU is not in the office.

## 2024-06-11 NOTE — TELEPHONE ENCOUNTER
Medications refilled per request. However, as Forteo is a very specialized medication, she will need to arrange an appointment for rheumatology for prescription/ management of this medication.

## 2024-06-17 ENCOUNTER — OFFICE VISIT (OUTPATIENT)
Dept: PAIN MEDICINE | Facility: CLINIC | Age: 69
End: 2024-06-17
Payer: MEDICARE

## 2024-06-17 VITALS
WEIGHT: 130 LBS | HEART RATE: 87 BPM | RESPIRATION RATE: 18 BRPM | SYSTOLIC BLOOD PRESSURE: 106 MMHG | DIASTOLIC BLOOD PRESSURE: 62 MMHG | HEIGHT: 61 IN | BODY MASS INDEX: 24.55 KG/M2

## 2024-06-17 DIAGNOSIS — M54.50 CHRONIC LOW BACK PAIN, UNSPECIFIED BACK PAIN LATERALITY, UNSPECIFIED WHETHER SCIATICA PRESENT: Primary | ICD-10-CM

## 2024-06-17 DIAGNOSIS — M80.08XA AGE-RELATED OSTEOPOROSIS WITH CURRENT PATHOLOGICAL FRACTURE OF VERTEBRA, INITIAL ENCOUNTER (MULTI): ICD-10-CM

## 2024-06-17 DIAGNOSIS — G89.29 CHRONIC LOW BACK PAIN, UNSPECIFIED BACK PAIN LATERALITY, UNSPECIFIED WHETHER SCIATICA PRESENT: Primary | ICD-10-CM

## 2024-06-17 DIAGNOSIS — M80.00XA AGE-RELATED OSTEOPOROSIS WITH CURRENT PATHOLOGICAL FRACTURE, INITIAL ENCOUNTER: ICD-10-CM

## 2024-06-17 PROCEDURE — 1160F RVW MEDS BY RX/DR IN RCRD: CPT | Performed by: ANESTHESIOLOGY

## 2024-06-17 PROCEDURE — 99204 OFFICE O/P NEW MOD 45 MIN: CPT | Performed by: ANESTHESIOLOGY

## 2024-06-17 PROCEDURE — 1125F AMNT PAIN NOTED PAIN PRSNT: CPT | Performed by: ANESTHESIOLOGY

## 2024-06-17 PROCEDURE — 1159F MED LIST DOCD IN RCRD: CPT | Performed by: ANESTHESIOLOGY

## 2024-06-17 PROCEDURE — 99214 OFFICE O/P EST MOD 30 MIN: CPT | Performed by: ANESTHESIOLOGY

## 2024-06-17 RX ORDER — HYDROCODONE BITARTRATE AND ACETAMINOPHEN 7.5; 325 MG/1; MG/1
1 TABLET ORAL 4 TIMES DAILY PRN
Qty: 56 TABLET | Refills: 0 | Status: SHIPPED | OUTPATIENT
Start: 2024-06-17 | End: 2024-07-01

## 2024-06-17 ASSESSMENT — ENCOUNTER SYMPTOMS
ENDOCRINE NEGATIVE: 1
EYES NEGATIVE: 1
CARDIOVASCULAR NEGATIVE: 1
RESPIRATORY NEGATIVE: 1
PSYCHIATRIC NEGATIVE: 1
BACK PAIN: 1
GASTROINTESTINAL NEGATIVE: 1
CONSTITUTIONAL NEGATIVE: 1
HEMATOLOGIC/LYMPHATIC NEGATIVE: 1

## 2024-06-17 ASSESSMENT — LIFESTYLE VARIABLES: TOTAL SCORE: 5

## 2024-06-17 ASSESSMENT — PAIN SCALES - GENERAL
PAINLEVEL: 6
PAINLEVEL_OUTOF10: 6

## 2024-06-17 ASSESSMENT — PAIN DESCRIPTION - DESCRIPTORS: DESCRIPTORS: ACHING

## 2024-06-17 ASSESSMENT — PAIN - FUNCTIONAL ASSESSMENT: PAIN_FUNCTIONAL_ASSESSMENT: 0-10

## 2024-06-17 NOTE — LETTER
June 18, 2024     Mahendra Luke MD  [address] [place], [facility] [address]  [place] [id number]    Patient: Rebekah Murillo   YOB: 1955   Date of Visit: 6/17/2024       Dear Dr. Mahendra Luke MD:    Thank you for referring Rebekah Murillo to me for evaluation. Below are my notes for this consultation.  If you have questions, please do not hesitate to call me. I look forward to following your patient along with you.       Sincerely,     Glenn Aleman MD      CC: No Recipients  ______________________________________________________________________________________    PAIN MANAGEMENT NEW PATIENT OFFICE NOTE    Date of Service: 6/17/2024    SUBJECTIVE    CHIEF COMPLAINT: LBP    HISTORY OF PRESENT ILLNESS    Rebekah Murillo is a 68 y.o. female with PMH COPD, former smoker who presents as new patient referred by Dr Luke with LB pain.    Pt describes incident on 5/23 where she felt sudden sharp pain while bending to  clothing. Pain is in mid low back without radiation. Pain is worst with standing/walking, transition from sit-to-stand or laying-sitting and vice versa. Feels better not moving. Underwent MRI L-spine 5/28 in [place] where she was dx'd with acute T7 50% VCF and 67% L1 burst fx. Since that time, pt has tried Tylenol, diclofenac, back brace, Norco without sustained relief    Pt denies new-onset numbness, weakness, bowel/bladder incontinence.  Pt denies recent infection, allergy to Latex/iodine/contrast. Patient is currently taking the following blood thinner(s): N/A    REVIEW OF SYSTEMS  Review of Systems   Constitutional: Negative.    HENT: Negative.     Eyes: Negative.    Respiratory: Negative.     Cardiovascular: Negative.    Gastrointestinal: Negative.    Endocrine: Negative.    Musculoskeletal:  Positive for back pain and gait problem.   Skin: Negative.    Hematological: Negative.    Psychiatric/Behavioral: Negative.         PAST MEDICAL HISTORY  Past Medical History:  "  Diagnosis Date   • Acute bacterial conjunctivitis of both eyes 09/20/2023   • Acute non-recurrent maxillary sinusitis 09/20/2023   • Acute pain of right knee 09/20/2023   • Bronchitis, acute 09/20/2023   • Urticaria, acute 09/20/2023     History reviewed. No pertinent surgical history.  No family history on file.    CURRENT MEDICATIONS  Current Outpatient Medications   Medication Sig Dispense Refill   • albuterol (Ventolin HFA) 90 mcg/actuation inhaler INHALE 1 TO 2 PUFFS EVERY 4 TO 6 HOURS AS NEEDED. 18 g 2   • ascorbic acid (Vitamin C) 1,000 mg tablet [facility] Vitamin C 1000 MG Oral Tablet   Refills: 0       Active     • buPROPion XL (Wellbutrin XL) 150 mg 24 hr tablet Take 1 tablet (150 mg) by mouth once daily in the morning. Take before meals.     • calcium carbonate (CALCIUM 600 ORAL) Take by mouth.     • cholecalciferol (Vitamin D-3) 25 MCG (1000 UT) tablet Take 1 tablet (25 mcg) by mouth once daily.     • citalopram (CeleXA) 40 mg tablet Take 1 tablet (40 mg) by mouth once daily.     • diclofenac (Voltaren) 75 mg EC tablet Take 1 tablet (75 mg) by mouth 2 times a day as needed (pain). Do not crush, chew, or split. 60 tablet 0   • multivitamin tablet Take 1 tablet by mouth once daily.     • traZODone (Desyrel) 100 mg tablet TAKE 2 TABLET Bedtime     • budesonide-formoteroL (Symbicort) 160-4.5 mcg/actuation inhaler Inhale 2 puffs 2 times a day. (Patient not taking: Reported on 6/17/2024) 1 each 2   • citalopram (CeleXA) 20 mg tablet Take 1 tablet (20 mg) by mouth once daily.     • tiZANidine (Zanaflex) 4 mg tablet Take 1 tablet (4 mg) by mouth every 6 hours if needed for muscle spasms. 60 tablet 0     No current facility-administered medications for this visit.       ALLERGIES AND DRUG REACTIONS  No Known Allergies       OBJECTIVE  Visit Vitals  /62   Pulse 87   Resp 18   Ht 1.549 m (5' 1\")   Wt 59 kg (130 lb)   BMI 24.56 kg/m²   OB Status Postmenopausal   Smoking Status Former   BSA 1.59 m²       Last " Recorded Pain Score (if available):                Physical Exam  Vitals and nursing note reviewed.       General: Sitting in chair, NAD  Head: NCAT  Eyes: Sclera/conjunctiva clear, EOMI, PERRL  Nose/mouth: MMM  CV: Good distal pulses  Lungs: Good/equal chest excursion  Abdomen: Soft, ND  Ext: No cyanosis/edema  MSK: L-spine alignment: thoracic kyphosis, BL paraspinal m TTP with L-spine TTP/percussion tenderness at thoracolumbar junction w/o tenderness over upper back, L-spine ROM: extension/flexion impeded by pain    Neuro: AAOx3   Dermatome sensation to light touch  LEFT L1 (lower pelvis/upper thigh): WNL    RIGHT L1: WNL      LEFT L2 (upper thigh): WNL       RIGHT: L2:WNL      LEFT L3 (medial knee): WNL       RIGHT L3: WNL      LEFT L4 (superior medial malleolus): WNL       RIGHT L4: WNL      LEFT L5 (dorsal foot): WNL       RIGHT L5: WNL      LEFT S1 (lateral foot): WNL     RIGHT S1: WNL      LEFT S2 (popliteal fossa): WNL    RIGHT S2: WNL        Motor strength  LEFT L2 (hip flexion): 5/5   RIGHT L2: 5/5  LEFT L3 (knee extension): 5/5     RIGHT L3: 5/5  LEFT L4 (dorsiflexion): 5/5     RIGHT L4: 5/5  LEFT L5 (EHL extension): 5/5     RIGHT L5: 5/5  LEFT S1 (plantarflexion): 5/5     RIGHT S1: 5/5  LEFT S2 (knee flexion): 5/5      RIGHT S2: 5/5    Special testing  DTR diminished patellar reflexes BL  Clonus: neg BL  Babinski: neg BL    Psych: affect nl  Skin: no rash/lesions      REVIEW OF LABORATORY DATA  I have reviewed the following lab results:  WBC   Date Value Ref Range Status   06/09/2022 11.4 (H) 4.4 - 11.3 x10E9/L Final     RBC   Date Value Ref Range Status   06/09/2022 4.63 4.00 - 5.20 x10E12/L Final     Hemoglobin   Date Value Ref Range Status   06/09/2022 15.2 12.0 - 16.0 g/dL Final     Hematocrit   Date Value Ref Range Status   06/09/2022 45.3 36.0 - 46.0 % Final     MCV   Date Value Ref Range Status   06/09/2022 98 80 - 100 fL Final     [facility]   Date Value Ref Range Status   06/09/2022 33.6 32.0 - 36.0  "g/dL Final     RDW   Date Value Ref Range Status   06/09/2022 12.5 11.5 - 14.5 % Final     Platelets   Date Value Ref Range Status   06/09/2022 203 150 - 450 x10E9/L Final     Sodium   Date Value Ref Range Status   06/09/2022 138 136 - 145 mmol/L Final     Potassium   Date Value Ref Range Status   06/09/2022 4.1 3.5 - 5.3 mmol/L Final     Bicarbonate   Date Value Ref Range Status   06/09/2022 29 21 - 32 mmol/L Final     Urea Nitrogen   Date Value Ref Range Status   06/09/2022 11 6 - 23 mg/dL Final     Calcium   Date Value Ref Range Status   06/09/2022 9.4 8.6 - 10.3 mg/dL Final     No results found for: \"PROTIME\", \"PTT\", \"INR\", \"FIBRINOGEN\"      REVIEW OF RADIOLOGY   I have reviewed the following:  Radiology Studies           MRI L-spine 5/28/24 @[facility]         ASSESSMENT & PLAN  Rebekah Murillo is a 68 y.o. old female with PMH COPD, former smoker who presents as new patient referred by Dr Luke with LB pain    1) LBP  -worse with standing/walking and reproduced with point and percussion tenderness over thoracolumbar spine likely 2/2 L1 burst fracture likely 2/2 osteoporosis  -Refractive to 1 mo conservative tx including Tylenol, diclofenac, back brace, Norco  -Reviewed/discussed MRI L-spine 5/28/24: acute T7 50% VCF and 67% L1 burst fx with retropulsion resulting in mild spinal canal narrowing. Left L1 pedicle 6.4 mm wide, right 7 mm  -Schedule L1 percutaneous vertebral augmentation to target pain generator as seen on imaging and minimize risk/likelihood of chronic opioid use and/or surgery  -In meantime, Norco 7.5 mg QID PRN x14 d          Discussed procedure risks/benefits in detail with patient. Pt meets medical necessity for procedure due to failure of conservative measures. Reviewed procedural risks including bleeding, infection, nerve damage, paralysis. Also reviewed mitigating factors such as screening for infection/blood thinner use, sterile precautions, and image-guidance when applicable. All questions " answered. Pt/guardian expressed understanding and choose to proceed           Glenn Aleman MD  Anesthesiologist & Interventional Pain Physician   [facility] [place]  O: [phone]  F: [phone]  2:47 PM  06/17/24

## 2024-06-17 NOTE — PROGRESS NOTES
PAIN MANAGEMENT NEW PATIENT OFFICE NOTE    Date of Service: 6/17/2024    SUBJECTIVE    CHIEF COMPLAINT: LBP    HISTORY OF PRESENT ILLNESS    Rebekah Murillo is a 68 y.o. female with PMH COPD, former smoker who presents as new patient referred by Dr Luke with LB pain.    Pt describes incident on 5/23 where she felt sudden sharp pain while bending to  clothing. Pain is in mid low back without radiation. Pain is worst with standing/walking, transition from sit-to-stand or laying-sitting and vice versa. Feels better not moving. Underwent MRI L-spine 5/28 in [place] where she was dx'd with acute T7 50% VCF and 67% L1 burst fx. Since that time, pt has tried Tylenol, diclofenac, back brace, Norco without sustained relief    Pt denies new-onset numbness, weakness, bowel/bladder incontinence.  Pt denies recent infection, allergy to Latex/iodine/contrast. Patient is currently taking the following blood thinner(s): N/A    REVIEW OF SYSTEMS  Review of Systems   Constitutional: Negative.    HENT: Negative.     Eyes: Negative.    Respiratory: Negative.     Cardiovascular: Negative.    Gastrointestinal: Negative.    Endocrine: Negative.    Musculoskeletal:  Positive for back pain and gait problem.   Skin: Negative.    Hematological: Negative.    Psychiatric/Behavioral: Negative.         PAST MEDICAL HISTORY  Past Medical History:   Diagnosis Date    Acute bacterial conjunctivitis of both eyes 09/20/2023    Acute non-recurrent maxillary sinusitis 09/20/2023    Acute pain of right knee 09/20/2023    Bronchitis, acute 09/20/2023    Urticaria, acute 09/20/2023     History reviewed. No pertinent surgical history.  No family history on file.    CURRENT MEDICATIONS  Current Outpatient Medications   Medication Sig Dispense Refill    albuterol (Ventolin HFA) 90 mcg/actuation inhaler INHALE 1 TO 2 PUFFS EVERY 4 TO 6 HOURS AS NEEDED. 18 g 2    ascorbic acid (Vitamin C) 1,000 mg tablet [facility] Vitamin C 1000 MG Oral Tablet   Refills:  "0       Active      buPROPion XL (Wellbutrin XL) 150 mg 24 hr tablet Take 1 tablet (150 mg) by mouth once daily in the morning. Take before meals.      calcium carbonate (CALCIUM 600 ORAL) Take by mouth.      cholecalciferol (Vitamin D-3) 25 MCG (1000 UT) tablet Take 1 tablet (25 mcg) by mouth once daily.      citalopram (CeleXA) 40 mg tablet Take 1 tablet (40 mg) by mouth once daily.      diclofenac (Voltaren) 75 mg EC tablet Take 1 tablet (75 mg) by mouth 2 times a day as needed (pain). Do not crush, chew, or split. 60 tablet 0    multivitamin tablet Take 1 tablet by mouth once daily.      traZODone (Desyrel) 100 mg tablet TAKE 2 TABLET Bedtime      budesonide-formoteroL (Symbicort) 160-4.5 mcg/actuation inhaler Inhale 2 puffs 2 times a day. (Patient not taking: Reported on 6/17/2024) 1 each 2    citalopram (CeleXA) 20 mg tablet Take 1 tablet (20 mg) by mouth once daily.      tiZANidine (Zanaflex) 4 mg tablet Take 1 tablet (4 mg) by mouth every 6 hours if needed for muscle spasms. 60 tablet 0     No current facility-administered medications for this visit.       ALLERGIES AND DRUG REACTIONS  No Known Allergies       OBJECTIVE  Visit Vitals  /62   Pulse 87   Resp 18   Ht 1.549 m (5' 1\")   Wt 59 kg (130 lb)   BMI 24.56 kg/m²   OB Status Postmenopausal   Smoking Status Former   BSA 1.59 m²       Last Recorded Pain Score (if available):                Physical Exam  Vitals and nursing note reviewed.       General: Sitting in chair, NAD  Head: NCAT  Eyes: Sclera/conjunctiva clear, EOMI, PERRL  Nose/mouth: MMM  CV: Good distal pulses  Lungs: Good/equal chest excursion  Abdomen: Soft, ND  Ext: No cyanosis/edema  MSK: L-spine alignment: thoracic kyphosis, BL paraspinal m TTP with L-spine TTP/percussion tenderness at thoracolumbar junction w/o tenderness over upper back, L-spine ROM: extension/flexion impeded by pain    Neuro: AAOx3   Dermatome sensation to light touch  LEFT L1 (lower pelvis/upper thigh): WNL    " RIGHT L1: WNL      LEFT L2 (upper thigh): WNL       RIGHT: L2:WNL      LEFT L3 (medial knee): WNL       RIGHT L3: WNL      LEFT L4 (superior medial malleolus): WNL       RIGHT L4: WNL      LEFT L5 (dorsal foot): WNL       RIGHT L5: WNL      LEFT S1 (lateral foot): WNL     RIGHT S1: WNL      LEFT S2 (popliteal fossa): WNL    RIGHT S2: WNL        Motor strength  LEFT L2 (hip flexion): 5/5   RIGHT L2: 5/5  LEFT L3 (knee extension): 5/5     RIGHT L3: 5/5  LEFT L4 (dorsiflexion): 5/5     RIGHT L4: 5/5  LEFT L5 (EHL extension): 5/5     RIGHT L5: 5/5  LEFT S1 (plantarflexion): 5/5     RIGHT S1: 5/5  LEFT S2 (knee flexion): 5/5      RIGHT S2: 5/5    Special testing  DTR diminished patellar reflexes BL  Clonus: neg BL  Babinski: neg BL    Psych: affect nl  Skin: no rash/lesions      REVIEW OF LABORATORY DATA  I have reviewed the following lab results:  WBC   Date Value Ref Range Status   06/09/2022 11.4 (H) 4.4 - 11.3 x10E9/L Final     RBC   Date Value Ref Range Status   06/09/2022 4.63 4.00 - 5.20 x10E12/L Final     Hemoglobin   Date Value Ref Range Status   06/09/2022 15.2 12.0 - 16.0 g/dL Final     Hematocrit   Date Value Ref Range Status   06/09/2022 45.3 36.0 - 46.0 % Final     MCV   Date Value Ref Range Status   06/09/2022 98 80 - 100 fL Final     MCHC   Date Value Ref Range Status   06/09/2022 33.6 32.0 - 36.0 g/dL Final     RDW   Date Value Ref Range Status   06/09/2022 12.5 11.5 - 14.5 % Final     Platelets   Date Value Ref Range Status   06/09/2022 203 150 - 450 x10E9/L Final     Sodium   Date Value Ref Range Status   06/09/2022 138 136 - 145 mmol/L Final     Potassium   Date Value Ref Range Status   06/09/2022 4.1 3.5 - 5.3 mmol/L Final     Bicarbonate   Date Value Ref Range Status   06/09/2022 29 21 - 32 mmol/L Final     Urea Nitrogen   Date Value Ref Range Status   06/09/2022 11 6 - 23 mg/dL Final     Calcium   Date Value Ref Range Status   06/09/2022 9.4 8.6 - 10.3 mg/dL Final     No results found for:  "\"PROTIME\", \"PTT\", \"INR\", \"FIBRINOGEN\"      REVIEW OF RADIOLOGY   I have reviewed the following:  Radiology Studies           MRI L[facility]spine 5/28/24 [facility]         ASSESSMENT & PLAN  Rebekah Murillo is a 68 y.o. old female with PMH COPD, former smoker who presents as new patient referred by Dr Luke with LB pain    1) LBP  -worse with standing/walking and reproduced with point and percussion tenderness over thoracolumbar spine likely 2/2 L1 burst fracture likely 2/2 osteoporosis  -Refractive to 1 mo conservative tx including Tylenol, diclofenac, back brace, Norco  -Reviewed/discussed MRI L-spine 5/28/24: acute T7 50% VCF and 67% L1 burst fx with retropulsion resulting in mild spinal canal narrowing. Left L1 pedicle 6.4 mm wide, right 7 mm  -Schedule L1 percutaneous vertebral augmentation to target pain generator as seen on imaging and minimize risk/likelihood of chronic opioid use and/or surgery  -In meantime, Norco 7.5 mg QID PRN x14 d          Discussed procedure risks/benefits in detail with patient. Pt meets medical necessity for procedure due to failure of conservative measures. Reviewed procedural risks including bleeding, infection, nerve damage, paralysis. Also reviewed mitigating factors such as screening for infection/blood thinner use, sterile precautions, and image-guidance when applicable. All questions answered. Pt/guardian expressed understanding and choose to proceed           Glenn Aleman MD  Anesthesiologist & Interventional Pain Physician   [facility] [place]  O: [phone]  F: [phone]  2:47 PM  06/17/24    "

## 2024-06-17 NOTE — H&P (VIEW-ONLY)
PAIN MANAGEMENT NEW PATIENT OFFICE NOTE    Date of Service: 6/17/2024    SUBJECTIVE    CHIEF COMPLAINT: LBP    HISTORY OF PRESENT ILLNESS    Rebekah Murillo is a 68 y.o. female with PMH COPD, former smoker who presents as new patient referred by Dr Luke with LB pain.    Pt describes incident on 5/23 where she felt sudden sharp pain while bending to  clothing. Pain is in mid low back without radiation. Pain is worst with standing/walking, transition from sit-to-stand or laying-sitting and vice versa. Feels better not moving. Underwent MRI L-spine 5/28 in [place] where she was dx'd with acute T7 50% VCF and 67% L1 burst fx. Since that time, pt has tried Tylenol, diclofenac, back brace, Norco without sustained relief    Pt denies new-onset numbness, weakness, bowel/bladder incontinence.  Pt denies recent infection, allergy to Latex/iodine/contrast. Patient is currently taking the following blood thinner(s): N/A    REVIEW OF SYSTEMS  Review of Systems   Constitutional: Negative.    HENT: Negative.     Eyes: Negative.    Respiratory: Negative.     Cardiovascular: Negative.    Gastrointestinal: Negative.    Endocrine: Negative.    Musculoskeletal:  Positive for back pain and gait problem.   Skin: Negative.    Hematological: Negative.    Psychiatric/Behavioral: Negative.         PAST MEDICAL HISTORY  Past Medical History:   Diagnosis Date    Acute bacterial conjunctivitis of both eyes 09/20/2023    Acute non-recurrent maxillary sinusitis 09/20/2023    Acute pain of right knee 09/20/2023    Bronchitis, acute 09/20/2023    Urticaria, acute 09/20/2023     History reviewed. No pertinent surgical history.  No family history on file.    CURRENT MEDICATIONS  Current Outpatient Medications   Medication Sig Dispense Refill    albuterol (Ventolin HFA) 90 mcg/actuation inhaler INHALE 1 TO 2 PUFFS EVERY 4 TO 6 HOURS AS NEEDED. 18 g 2    ascorbic acid (Vitamin C) 1,000 mg tablet [facility] Vitamin C 1000 MG Oral Tablet   Refills:  "0       Active      buPROPion XL (Wellbutrin XL) 150 mg 24 hr tablet Take 1 tablet (150 mg) by mouth once daily in the morning. Take before meals.      calcium carbonate (CALCIUM 600 ORAL) Take by mouth.      cholecalciferol (Vitamin D-3) 25 MCG (1000 UT) tablet Take 1 tablet (25 mcg) by mouth once daily.      citalopram (CeleXA) 40 mg tablet Take 1 tablet (40 mg) by mouth once daily.      diclofenac (Voltaren) 75 mg EC tablet Take 1 tablet (75 mg) by mouth 2 times a day as needed (pain). Do not crush, chew, or split. 60 tablet 0    multivitamin tablet Take 1 tablet by mouth once daily.      traZODone (Desyrel) 100 mg tablet TAKE 2 TABLET Bedtime      budesonide-formoteroL (Symbicort) 160-4.5 mcg/actuation inhaler Inhale 2 puffs 2 times a day. (Patient not taking: Reported on 6/17/2024) 1 each 2    citalopram (CeleXA) 20 mg tablet Take 1 tablet (20 mg) by mouth once daily.      tiZANidine (Zanaflex) 4 mg tablet Take 1 tablet (4 mg) by mouth every 6 hours if needed for muscle spasms. 60 tablet 0     No current facility-administered medications for this visit.       ALLERGIES AND DRUG REACTIONS  No Known Allergies       OBJECTIVE  Visit Vitals  /62   Pulse 87   Resp 18   Ht 1.549 m (5' 1\")   Wt 59 kg (130 lb)   BMI 24.56 kg/m²   OB Status Postmenopausal   Smoking Status Former   BSA 1.59 m²       Last Recorded Pain Score (if available):                Physical Exam  Vitals and nursing note reviewed.       General: Sitting in chair, NAD  Head: NCAT  Eyes: Sclera/conjunctiva clear, EOMI, PERRL  Nose/mouth: MMM  CV: Good distal pulses  Lungs: Good/equal chest excursion  Abdomen: Soft, ND  Ext: No cyanosis/edema  MSK: L-spine alignment: thoracic kyphosis, BL paraspinal m TTP with L-spine TTP/percussion tenderness at thoracolumbar junction w/o tenderness over upper back, L-spine ROM: extension/flexion impeded by pain    Neuro: AAOx3   Dermatome sensation to light touch  LEFT L1 (lower pelvis/upper thigh): WNL    " RIGHT L1: WNL      LEFT L2 (upper thigh): WNL       RIGHT: L2:WNL      LEFT L3 (medial knee): WNL       RIGHT L3: WNL      LEFT L4 (superior medial malleolus): WNL       RIGHT L4: WNL      LEFT L5 (dorsal foot): WNL       RIGHT L5: WNL      LEFT S1 (lateral foot): WNL     RIGHT S1: WNL      LEFT S2 (popliteal fossa): WNL    RIGHT S2: WNL        Motor strength  LEFT L2 (hip flexion): 5/5   RIGHT L2: 5/5  LEFT L3 (knee extension): 5/5     RIGHT L3: 5/5  LEFT L4 (dorsiflexion): 5/5     RIGHT L4: 5/5  LEFT L5 (EHL extension): 5/5     RIGHT L5: 5/5  LEFT S1 (plantarflexion): 5/5     RIGHT S1: 5/5  LEFT S2 (knee flexion): 5/5      RIGHT S2: 5/5    Special testing  DTR diminished patellar reflexes BL  Clonus: neg BL  Babinski: neg BL    Psych: affect nl  Skin: no rash/lesions      REVIEW OF LABORATORY DATA  I have reviewed the following lab results:  WBC   Date Value Ref Range Status   06/09/2022 11.4 (H) 4.4 - 11.3 x10E9/L Final     RBC   Date Value Ref Range Status   06/09/2022 4.63 4.00 - 5.20 x10E12/L Final     Hemoglobin   Date Value Ref Range Status   06/09/2022 15.2 12.0 - 16.0 g/dL Final     Hematocrit   Date Value Ref Range Status   06/09/2022 45.3 36.0 - 46.0 % Final     MCV   Date Value Ref Range Status   06/09/2022 98 80 - 100 fL Final     MCHC   Date Value Ref Range Status   06/09/2022 33.6 32.0 - 36.0 g/dL Final     RDW   Date Value Ref Range Status   06/09/2022 12.5 11.5 - 14.5 % Final     Platelets   Date Value Ref Range Status   06/09/2022 203 150 - 450 x10E9/L Final     Sodium   Date Value Ref Range Status   06/09/2022 138 136 - 145 mmol/L Final     Potassium   Date Value Ref Range Status   06/09/2022 4.1 3.5 - 5.3 mmol/L Final     Bicarbonate   Date Value Ref Range Status   06/09/2022 29 21 - 32 mmol/L Final     Urea Nitrogen   Date Value Ref Range Status   06/09/2022 11 6 - 23 mg/dL Final     Calcium   Date Value Ref Range Status   06/09/2022 9.4 8.6 - 10.3 mg/dL Final     No results found for:  "\"PROTIME\", \"PTT\", \"INR\", \"FIBRINOGEN\"      REVIEW OF RADIOLOGY   I have reviewed the following:  Radiology Studies           MRI L[facility]spine 5/28/24 @[facility]         ASSESSMENT & PLAN  Rebekah Murillo is a 68 y.o. old female with PMH COPD, former smoker who presents as new patient referred by Dr Luke with LB pain    1) LBP  -worse with standing/walking and reproduced with point and percussion tenderness over thoracolumbar spine likely 2/2 L1 burst fracture likely 2/2 osteoporosis  -Refractive to 1 mo conservative tx including Tylenol, diclofenac, back brace, Norco  -Reviewed/discussed MRI L-spine 5/28/24: acute T7 50% VCF and 67% L1 burst fx with retropulsion resulting in mild spinal canal narrowing. Left L1 pedicle 6.4 mm wide, right 7 mm  -Schedule L1 percutaneous vertebral augmentation to target pain generator as seen on imaging and minimize risk/likelihood of chronic opioid use and/or surgery  -In meantime, Norco 7.5 mg QID PRN x14 d          Discussed procedure risks/benefits in detail with patient. Pt meets medical necessity for procedure due to failure of conservative measures. Reviewed procedural risks including bleeding, infection, nerve damage, paralysis. Also reviewed mitigating factors such as screening for infection/blood thinner use, sterile precautions, and image-guidance when applicable. All questions answered. Pt/guardian expressed understanding and choose to proceed           Glenn Aleman MD  Anesthesiologist & Interventional Pain Physician   [facility]  O: [phone]  F: [phone]  2:47 PM  06/17/24    "

## 2024-06-18 ENCOUNTER — TELEPHONE (OUTPATIENT)
Dept: PAIN MEDICINE | Facility: CLINIC | Age: 69
End: 2024-06-18
Payer: MEDICARE

## 2024-06-18 PROBLEM — G89.29 CHRONIC LOW BACK PAIN: Status: ACTIVE | Noted: 2024-06-18

## 2024-06-18 PROBLEM — M80.08XA AGE-RELATED OSTEOPOROSIS WITH CURRENT PATHOLOGICAL FRACTURE OF VERTEBRA (MULTI): Status: ACTIVE | Noted: 2024-06-18

## 2024-06-18 PROBLEM — M54.50 CHRONIC LOW BACK PAIN: Status: ACTIVE | Noted: 2024-06-18

## 2024-06-18 NOTE — TELEPHONE ENCOUNTER
Rebekah called this am with some questions regarding her pending kyphoplasty.  I told her 2-3 weeks o get clearance from insurance before she could have the kyphoplasty .  4-6 week recovery period.  Pt very concerned as she [place] and she WILL NOT MISS IT!  Lapaz comfortable with my answers to her questions and I told her I would be mailing her discharge instructions for her to look at and to call me if she has any questions.  She is also to call her PCP to start taking something for her osteoporosis other than supplements.

## 2024-06-26 ENCOUNTER — APPOINTMENT (OUTPATIENT)
Dept: PRIMARY CARE | Facility: CLINIC | Age: 69
End: 2024-06-26
Payer: MEDICARE

## 2024-07-01 ENCOUNTER — LAB (OUTPATIENT)
Dept: LAB | Facility: LAB | Age: 69
End: 2024-07-01
Payer: MEDICARE

## 2024-07-01 ENCOUNTER — APPOINTMENT (OUTPATIENT)
Dept: RHEUMATOLOGY | Facility: CLINIC | Age: 69
End: 2024-07-01
Payer: MEDICARE

## 2024-07-01 ENCOUNTER — SPECIALTY PHARMACY (OUTPATIENT)
Dept: PHARMACY | Facility: CLINIC | Age: 69
End: 2024-07-01

## 2024-07-01 VITALS
OXYGEN SATURATION: 95 % | WEIGHT: 129.8 LBS | DIASTOLIC BLOOD PRESSURE: 67 MMHG | HEIGHT: 60 IN | SYSTOLIC BLOOD PRESSURE: 91 MMHG | HEART RATE: 77 BPM | BODY MASS INDEX: 25.48 KG/M2

## 2024-07-01 DIAGNOSIS — M54.50 CHRONIC LOW BACK PAIN, UNSPECIFIED BACK PAIN LATERALITY, UNSPECIFIED WHETHER SCIATICA PRESENT: ICD-10-CM

## 2024-07-01 DIAGNOSIS — M80.00XA AGE-RELATED OSTEOPOROSIS WITH CURRENT PATHOLOGICAL FRACTURE, INITIAL ENCOUNTER: Primary | ICD-10-CM

## 2024-07-01 DIAGNOSIS — G89.29 CHRONIC LOW BACK PAIN, UNSPECIFIED BACK PAIN LATERALITY, UNSPECIFIED WHETHER SCIATICA PRESENT: ICD-10-CM

## 2024-07-01 DIAGNOSIS — M80.00XA AGE-RELATED OSTEOPOROSIS WITH CURRENT PATHOLOGICAL FRACTURE, INITIAL ENCOUNTER: ICD-10-CM

## 2024-07-01 LAB
25(OH)D3 SERPL-MCNC: 141 NG/ML (ref 30–100)
PTH-INTACT SERPL-MCNC: 21.5 PG/ML (ref 18.5–88)

## 2024-07-01 PROCEDURE — 36415 COLL VENOUS BLD VENIPUNCTURE: CPT

## 2024-07-01 PROCEDURE — 1036F TOBACCO NON-USER: CPT | Performed by: INTERNAL MEDICINE

## 2024-07-01 PROCEDURE — 82306 VITAMIN D 25 HYDROXY: CPT

## 2024-07-01 PROCEDURE — 83970 ASSAY OF PARATHORMONE: CPT

## 2024-07-01 PROCEDURE — RXMED WILLOW AMBULATORY MEDICATION CHARGE

## 2024-07-01 PROCEDURE — 82523 COLLAGEN CROSSLINKS: CPT

## 2024-07-01 PROCEDURE — 1159F MED LIST DOCD IN RCRD: CPT | Performed by: INTERNAL MEDICINE

## 2024-07-01 PROCEDURE — 84078 ASSAY ALKALINE PHOSPHATASE: CPT

## 2024-07-01 PROCEDURE — 99204 OFFICE O/P NEW MOD 45 MIN: CPT | Performed by: INTERNAL MEDICINE

## 2024-07-01 RX ORDER — HYDROCODONE BITARTRATE AND ACETAMINOPHEN 7.5; 325 MG/1; MG/1
1 TABLET ORAL 4 TIMES DAILY PRN
Qty: 56 TABLET | Refills: 0 | Status: SHIPPED | OUTPATIENT
Start: 2024-07-01 | End: 2024-07-15

## 2024-07-01 RX ORDER — PEN NEEDLE, DIABETIC 30 GX3/16"
NEEDLE, DISPOSABLE MISCELLANEOUS
Qty: 30 EACH | Refills: 11 | Status: SHIPPED | OUTPATIENT
Start: 2024-07-01

## 2024-07-01 RX ORDER — TERIPARATIDE 250 UG/ML
20 INJECTION, SOLUTION SUBCUTANEOUS DAILY
Qty: 1 EACH | Refills: 11 | Status: SHIPPED | OUTPATIENT
Start: 2024-07-01 | End: 2025-07-01

## 2024-07-01 ASSESSMENT — ENCOUNTER SYMPTOMS
FATIGUE: 1
BACK PAIN: 1
ABDOMINAL PAIN: 0
SHORTNESS OF BREATH: 1

## 2024-07-01 NOTE — PROGRESS NOTES
Subjective   Patient ID: Rebekah Murillo is a 68 y.o. female who presents for New Patient Visit.  HPI  Patient with osteoporosis and recent compression fracture at L1, acute to subacute T7 inferior endplate compression fracture.  She also has a history of right humeral fracture in 2012 and 3 months later unfortunately fractured her left radius and ulna.  Also has a history of right patella fracture in 2016.  She is postmenopausal and positive family history.  She believes she had been on bisphosphonates previously and was on 2 different ones years ago but they did not help when she had been on the medication.  She did have a bone density in October 2022 that noted T-score -5, left femoral neck -4, left total hip -3.6    Patient is accompanied by her  today.  She does not do too much weightbearing activity on a regular basis.    Patient was leaning over her luggage to get things out when she felt the sudden back pain and suffered her L1 fracture.  She had been in [place] at that time.  The plan for her is to get kyphoplasty but currently is try to get approved through insurance.  Recently started getting a rash on her knees  Positive family history of osteoporosis in her mother, brother, and nephew.    Social history includes past tobacco use, quit this past September 2023.  Denies any current alcohol or illicit drug use.    Review of Systems   Constitutional:  Positive for fatigue.        Weakness and night sweats   HENT:  Positive for hearing loss.         Dry mouth   Eyes:         Blurry vision, dry eye   Respiratory:  Positive for shortness of breath.    Cardiovascular:  Negative for chest pain.   Gastrointestinal:  Negative for abdominal pain.   Musculoskeletal:  Positive for back pain.   Skin:  Positive for rash.       Objective   Physical Exam  GEN: NAD A&O x3 appropriate affect.  Seated has torso brace on   Assessment/Plan        Severe osteoporosis with recent burst fracture of L1 and also  compression fracture at L7.  Also history of right humerus fracture and left radius ulnar fracture, all postmenopausal.  Has significant osteoporosis seen on bone density in 2022.  I feel due to the severity of her osteoporosis an anabolic agent is warranted.  She says she had been on bisphosphonate medications previously that did not significantly help her bone density but I do not see any documentation for this.  Discussed different anabolic agents such as Teriparatide and Evenity.  Patient is open to teriparatide.  She has a previous smoker so there is some concern for cardiovascular risk that might be associated with Evenity.  Will get blood work today and will send a prescription to the specialty pharmacy.    Shabana Guerrero MD 07/01/24 11:14 AM

## 2024-07-01 NOTE — TELEPHONE ENCOUNTER
"Pt calls for refill of Norco 7.5/325 mg. States \"I ran out today, I can't run out. Please help.\"  "

## 2024-07-01 NOTE — LETTER
July 1, 2024     Yaniv Whitehead MD  [address]  [address]  [facility] [address]    Patient: Rebekah Murillo   YOB: 1955   Date of Visit: 7/1/2024       Dear Dr. Yaniv Whitehead MD:    Thank you for referring Rebekah Murillo to me for evaluation. Below are my notes for this consultation.  If you have questions, please do not hesitate to call me. I look forward to following your patient along with you.       Sincerely,     Shabana Guerrero MD      CC: Mahendra Luke MD  ______________________________________________________________________________________    Subjective  Patient ID: Rebekah Murillo is a 68 y.o. female who presents for New Patient Visit.  HPI  Patient with osteoporosis and recent compression fracture at L1, acute to subacute T7 inferior endplate compression fracture.  She also has a history of right humeral fracture in 2012 and 3 months later unfortunately fractured her left radius and ulna.  Also has a history of right patella fracture in 2016.  She is postmenopausal and positive family history.  She believes she had been on bisphosphonates previously and was on 2 different ones years ago but they did not help when she had been on the medication.  She did have a bone density in October 2022 that noted T-score -5, left femoral neck -4, left total hip -3.6    Patient is accompanied by her  today.  She does not do too much weightbearing activity on a regular basis.    Patient was leaning over her luggage to get things out when she felt the sudden back pain and suffered her L1 fracture.  She had been in [place] at that time.  The plan for her is to get kyphoplasty but currently is try to get approved through insurance.  Recently started getting a rash on her knees  Positive family history of osteoporosis in her mother, brother, and nephew.    Social history includes past tobacco use, quit this past September 2023.  Denies any current alcohol or illicit drug use.    Review of  Systems   Constitutional:  Positive for fatigue.        Weakness and night sweats   HENT:  Positive for hearing loss.         Dry mouth   Eyes:         Blurry vision, dry eye   Respiratory:  Positive for shortness of breath.    Cardiovascular:  Negative for chest pain.   Gastrointestinal:  Negative for abdominal pain.   Musculoskeletal:  Positive for back pain.   Skin:  Positive for rash.       Objective  Physical Exam  GEN: NAD A&O x3 appropriate affect.  Seated has torso brace on   Assessment/Plan       Severe osteoporosis with recent burst fracture of L1 and also compression fracture at L7.  Also history of right humerus fracture and left radius ulnar fracture, all postmenopausal.  Has significant osteoporosis seen on bone density in 2022.  I feel due to the severity of her osteoporosis an anabolic agent is warranted.  She says she had been on bisphosphonate medications previously that did not significantly help her bone density but I do not see any documentation for this.  Discussed different anabolic agents such as Teriparatide and Evenity.  Patient is open to teriparatide.  She has a previous smoker so there is some concern for cardiovascular risk that might be associated with Evenity.  Will get blood work today and will send a prescription to the specialty pharmacy.    Shabana Guerrero MD 07/01/24 11:14 AM

## 2024-07-02 ENCOUNTER — PREP FOR PROCEDURE (OUTPATIENT)
Dept: INTERNAL MEDICINE | Facility: HOSPITAL | Age: 69
End: 2024-07-02
Payer: MEDICARE

## 2024-07-02 DIAGNOSIS — M80.08XD AGE-RELATED OSTEOPOROSIS WITH CURRENT PATHOLOGICAL FRACTURE OF VERTEBRA WITH ROUTINE HEALING, SUBSEQUENT ENCOUNTER: Primary | ICD-10-CM

## 2024-07-02 DIAGNOSIS — M80.08XA AGE-RELATED OSTEOPOROSIS WITH CURRENT PATHOLOGICAL FRACTURE, VERTEBRA(E), INITIAL ENCOUNTER FOR FRACTURE (MULTI): ICD-10-CM

## 2024-07-02 RX ORDER — SODIUM CHLORIDE, SODIUM LACTATE, POTASSIUM CHLORIDE, CALCIUM CHLORIDE 600; 310; 30; 20 MG/100ML; MG/100ML; MG/100ML; MG/100ML
100 INJECTION, SOLUTION INTRAVENOUS CONTINUOUS
OUTPATIENT
Start: 2024-07-02

## 2024-07-05 LAB
ALP BONE SERPL-MCNC: 24.5 UG/L
COLLAGEN CTX SERPL-MCNC: 710 PG/ML

## 2024-07-08 ENCOUNTER — PRE-ADMISSION TESTING (OUTPATIENT)
Dept: PREADMISSION TESTING | Facility: HOSPITAL | Age: 69
End: 2024-07-08
Payer: MEDICARE

## 2024-07-08 VITALS
HEIGHT: 60 IN | SYSTOLIC BLOOD PRESSURE: 105 MMHG | TEMPERATURE: 98.6 F | WEIGHT: 130.7 LBS | HEART RATE: 67 BPM | BODY MASS INDEX: 25.66 KG/M2 | OXYGEN SATURATION: 98 % | DIASTOLIC BLOOD PRESSURE: 89 MMHG

## 2024-07-08 DIAGNOSIS — Z01.818 PREOPERATIVE TESTING: Primary | ICD-10-CM

## 2024-07-08 PROCEDURE — 87081 CULTURE SCREEN ONLY: CPT | Mod: WESLAB | Performed by: NURSE PRACTITIONER

## 2024-07-08 PROCEDURE — 99204 OFFICE O/P NEW MOD 45 MIN: CPT | Performed by: NURSE PRACTITIONER

## 2024-07-08 RX ORDER — BUDESONIDE AND FORMOTEROL FUMARATE DIHYDRATE 160; 4.5 UG/1; UG/1
2 AEROSOL RESPIRATORY (INHALATION)
COMMUNITY

## 2024-07-08 RX ORDER — CHLORHEXIDINE GLUCONATE ORAL RINSE 1.2 MG/ML
SOLUTION DENTAL
Qty: 473 ML | Refills: 0 | Status: SHIPPED | OUTPATIENT
Start: 2024-07-08

## 2024-07-08 ASSESSMENT — ENCOUNTER SYMPTOMS
ARTHRALGIAS: 1
BACK PAIN: 1
COUGH: 1
GASTROINTESTINAL NEGATIVE: 1
CARDIOVASCULAR NEGATIVE: 1
ACTIVITY CHANGE: 1
EYES NEGATIVE: 1
PSYCHIATRIC NEGATIVE: 1

## 2024-07-08 ASSESSMENT — DUKE ACTIVITY SCORE INDEX (DASI)
CAN YOU WALK INDOORS, SUCH AS AROUND YOUR HOUSE: YES
CAN YOU DO YARD WORK LIKE RAKING LEAVES, WEEDING OR PUSHING A MOWER: NO
CAN YOU DO MODERATE WORK AROUND THE HOUSE LIKE VACUUMING, SWEEPING FLOORS OR CARRYING GROCERIES: NO
TOTAL_SCORE: 15.45
CAN YOU DO HEAVY WORK AROUND THE HOUSE LIKE SCRUBBING FLOORS OR LIFTING AND MOVING HEAVY FURNITURE: NO
CAN YOU PARTICIPATE IN STRENOUS SPORTS LIKE SWIMMING, SINGLES TENNIS, FOOTBALL, BASKETBALL, OR SKIING: NO
CAN YOU CLIMB A FLIGHT OF STAIRS OR WALK UP A HILL: YES
CAN YOU HAVE SEXUAL RELATIONS: NO
CAN YOU TAKE CARE OF YOURSELF (EAT, DRESS, BATHE, OR USE TOILET): YES
CAN YOU DO LIGHT WORK AROUND THE HOUSE LIKE DUSTING OR WASHING DISHES: YES
CAN YOU WALK A BLOCK OR TWO ON LEVEL GROUND: YES
CAN YOU PARTICIPATE IN MODERATE RECREATIONAL ACTIVITIES LIKE GOLF, BOWLING, DANCING, DOUBLES TENNIS OR THROWING A BASEBALL OR FOOTBALL: NO
DASI METS SCORE: 4.6
CAN YOU RUN A SHORT DISTANCE: NO

## 2024-07-08 ASSESSMENT — PAIN SCALES - GENERAL: PAINLEVEL_OUTOF10: 6

## 2024-07-08 ASSESSMENT — PAIN - FUNCTIONAL ASSESSMENT: PAIN_FUNCTIONAL_ASSESSMENT: 0-10

## 2024-07-08 ASSESSMENT — PAIN DESCRIPTION - DESCRIPTORS: DESCRIPTORS: ACHING

## 2024-07-08 NOTE — PREPROCEDURE INSTRUCTIONS
Medication List            Accurate as of July 8, 2024 11:05 AM. Always use your most recent med list.                albuterol 90 mcg/actuation inhaler  Commonly known as: Ventolin HFA  INHALE 1 TO 2 PUFFS EVERY 4 TO 6 HOURS AS NEEDED.  Medication Adjustments for Surgery: Other (Comment)  Notes to patient: CAN USE THE MORNING OF SURGERY IF NEEDED     ascorbic acid 1,000 mg tablet  Commonly known as: Vitamin C  Medication Adjustments for Surgery: Other (Comment)  Notes to patient: HOLD MORNING OF SURGERY     budesonide-formoteroL 160-4.5 mcg/actuation inhaler  Commonly known as: Symbicort  Medication Adjustments for Surgery: Other (Comment)  Notes to patient: CAN USE THE MORNING OF SURGERY IF NEEDED     buPROPion  mg 24 hr tablet  Commonly known as: Wellbutrin XL  Medication Adjustments for Surgery: Take morning of surgery with sip of water, no other fluids     CALCIUM 600 ORAL  Medication Adjustments for Surgery: Other (Comment)  Notes to patient: HOLD THE MORNING OF SURGERY     cholecalciferol 125 MCG (5000 UT) capsule  Commonly known as: Vitamin D-3  Medication Adjustments for Surgery: Other (Comment)  Notes to patient: HOLD THE MORNING OF SURGERY     citalopram 40 mg tablet  Commonly known as: CeleXA  Medication Adjustments for Surgery: Take morning of surgery with sip of water, no other fluids     diclofenac 75 mg EC tablet  Commonly known as: Voltaren  Take 1 tablet (75 mg) by mouth 2 times a day as needed (pain). Do not crush, chew, or split.  Medication Adjustments for Surgery: Other (Comment)  Notes to patient: HOLD THE MORNING OF SURGERY     HYDROcodone-acetaminophen 7.5-325 mg tablet  Commonly known as: Norco  Take 1 tablet by mouth 4 times a day as needed for severe pain (7 - 10) for up to 14 days.  Medication Adjustments for Surgery: Other (Comment)  Notes to patient: CAN TAKE THE MORNING OF SURGERY IF NEEDED     multivitamin tablet  Medication Adjustments for Surgery: Other (Comment)  Notes to  "patient: HOLD THE DAY OF SURGERY     pen needle, diabetic 31 gauge x 5/16\" needle  Commonly known as: BD Ultra-Fine Short Pen Needle  Use Forteo injection     teriparatide injection  Commonly known as: Forteo  Inject 0.08 mL (20 mcg) under the skin once daily.     traZODone 100 mg tablet  Commonly known as: Desyrel  Medication Adjustments for Surgery: Other (Comment)  Notes to patient: CAN TAKE THE NIGHT BEFORE SURGERY                 Preoperative Fasting Guidelines    Why must I stop eating and drinking near surgery time?  With sedation, food or liquid in your stomach can enter your lungs causing serious complications  Increases nausea and vomiting    When do I need to stop eating and drinking before my surgery?  Do not eat any food or drink any liquids after midnight the night before your surgery/procedure.  You may have sips of water to take medications.    PAT DISCHARGE INSTRUCTIONS    Please call the Same Day Surgery (SDS) Department of the hospital where your procedure will be performed after 2:00 PM the day before your surgery. If you are scheduled on a Monday, or a Tuesday following a Monday holiday, you will need to call on the last business day prior to your surgery.    [facility] [facility]  [address] [place] [address], [id number] [phone]  [facility]  [address] [id number] [phone]  [address]  [place] [id number] [phone]    Please let your surgeon know if:      You develop any open sores, shingles, burning or painful urination as these may increase your risk of an infection.   You no longer wish to have the surgery.   Any other personal circumstances change that may lead to the need to cancel or defer this surgery-such as being sick or getting admitted to any hospital within one week of your planned procedure.    Your contact " details change, such as a change of address or phone number.    Starting now:     Please DO NOT drink alcohol or smoke for 24 hours before surgery. It is well known that quitting smoking can make a huge difference to your health and recovery from surgery. The longer you abstain from smoking, the better your chances of a healthy recovery. If you need help with quitting, call 4-106-QUIT-NOW to be connected to a trained counselor who will discuss the best methods to help you quit.     Before your surgery:    Please stop all supplements 7 days prior to surgery. Or as directed by your surgeon.   Please stop taking NSAID pain medicine such as Advil and Motrin 7 days before surgery.    If you develop any fever, cough, cold, rashes, cuts, scratches, scrapes, urinary symptoms or infection anywhere on your body (including teeth and gums) prior to surgery, please call your surgeon’s office as soon as possible. This may require treatment to reduce the chance of cancellation on the day of surgery.    The day before your surgery:   DIET- Please follow the diet instructions at the top of your packet.   Get a good night’s rest.  Use the special soap for bathing if you have been instructed to use one.    Scheduled surgery times may change and you will be notified if this occurs - please check your personal voicemail for any updates.     On the morning of surgery:   Wear comfortable, loose fitting clothes which open in the front. Please do not wear moisturizers, creams, lotions, makeup or perfume.    Please bring with you to surgery:   Photo ID and insurance card   Current list of medicines and allergies   Pacemaker/ Defibrillator/Heart stent cards   CPAP machine and mask    Slings/ splints/ crutches   A copy of your complete advanced directive/DHPOA.    Please do NOT bring with you to surgery:   All jewelry and valuables should be left at home.   Prosthetic devices such as contact lenses, hearing aids, dentures, eyelash extensions,  hairpins and body piercings must be removed prior to going in to the surgical suite.    After outpatient surgery:   A responsible adult MUST accompany you at the time of discharge and stay with you for 24 hours after your surgery. You may NOT drive yourself home after surgery.    Do not drive, operate machinery, make critical decisions or do activities that require co-ordination or balance until after a night’s sleep.   Do not drink alcoholic beverages for 24 hours.   Instructions for resuming your medications will be provided by your surgeon.    CALL YOUR DOCTOR AFTER SURGERY IF YOU HAVE:     Chills and/or a fever of 101° F or higher.    Redness, swelling, pus or drainage from your surgical wound or a bad smell from the wound.    Lightheadedness, fainting or confusion.    Persistent vomiting (throwing up) and are not able to eat or drink for 12 hours.    Three or more loose, watery bowel movements in 24 hours (diarrhea).   Difficulty or pain while urinating( after non-urological surgery)    Pain and swelling in your legs, especially if it is only on one side.    Difficulty breathing or are breathing faster than normal.    Any new concerning symptoms.      Patient Information: Pre-Operative Infection Prevention Measures     Why did I have my nose, under my arms, and groin swabbed?  The purpose of the swab is to identify Staphylococcus aureus inside your nose or on your skin.  The swab was sent to the laboratory for culture.  A positive swab/culture for Staphylococcus aureus is called colonization or carriage.      What is Staphylococcus aureus?  Staphylococcus aureus, also known as “staph”, is a germ found on the skin or in the nose of healthy people.  Sometimes Staphylococcus aureus can get into the body and cause an infection.  This can be minor (such as pimples, boils, or other skin problems).  It might also be serious (such as a blood infection, pneumonia, or a surgical site infection).    What is Staphylococcus  aureus colonization or carriage?  Colonization or carriage means that a person has the germ but is not sick from it.  These bacteria can be spread on the hands or when breathing or sneezing.    How is Staphylococcus aureus spread?  It is most often spread by close contact with a person or item that carries it.    What happens if my culture is positive for Staphylococcus aureus?  Your doctor/medical team will use this information to guide any antibiotic treatment which may be necessary.  Regardless of the culture results, we will clean the inside of your nose with a betadine swab just before you have your surgery.      Will I get an infection if I have Staphylococcus aureus in my nose or on my skin?  Anyone can get an infection with Staphylococcus aureus.  However, the best way to reduce your risk of infection is to follow the instructions provided to you for the use of your CHG soap and dental rinse.        Patient Information: Oral/Dental Rinse    What is oral/dental rinse?   It is a mouthwash. It is a way of cleaning the mouth with a germ-killing solution before your surgery.  The solution contains chlorhexidine, commonly known as CHG.   It is used inside the mouth to kill a bacteria known as Staphylococcus aureus.  Let your doctor know if you are allergic to Chlorhexidine.    Why do I need to use CHG oral/dental rinse?  The CHG oral/dental rinse helps to kill a bacteria in your mouth known as Staphylococcus aureus.     This reduces the risk of infection at the surgical site.      Using your CHG oral/dental rinse  STEPS:  Use your CHG oral/dental rinse after you brush your teeth the night before (at bedtime) and the morning of your surgery.  Follow all directions on your prescription label.    Use the cap on the container to measure 15ml   Swish (gargle if you can) the mouthwash in your mouth for at least 30 seconds, (do not swallow) and spit out  After you use your CHG rinse, do not rinse your mouth with water,  drink or eat.  Please refer to the prescription label for the appropriate time to resume oral intake      What side effects might I have using the CHG oral/dental rinse?  CHG rinse will stick to plaque on the teeth.  Brush and floss just before use.  Teeth brushing will help avoid staining of plaque during use.      Patient Information: Home Preoperative Antibacterial Shower      What is a home preoperative antibacterial shower?  This shower is a way of cleaning the skin with a germ-killing solution before surgery.  The solution contains chlorhexidine, commonly known as CHG.  CHG is a skin cleanser with germ-killing ability.  Let your doctor know if you are allergic to chlorhexidine.    Why do I need to take a preoperative antibacterial shower?  Skin is not sterile.  It is best to try to make your skin as free of germs as possible before surgery.  Proper cleansing with a germ-killing soap before surgery can lower the number of germs on your skin.  This helps to reduce the risk of infection at the surgical site.  Following the instructions listed below will help you prepare your skin for surgery.      How do I use the solution?  Steps:  Begin using your CHG TONIGHT 7/8/24 AND THE MORNING OF SURGERY 7/9/24.    First, wash and rinse your hair using the CHG soap. Keep CHG soap away from ear canals and eyes.  Rinse completely, do not condition.  Hair extensions should be removed.  Wash your face with your normal soap and rinse.    Apply the CHG solution to a clean wet washcloth.  Turn the water off or move away from the water spray to avoid premature rinsing of the CHG soap as you are applying.   Firmly lather your entire body from the neck down.  Do not use on your face.  Pay special attention to the area(s) where your incision(s) will be located unless they are on your face.  Avoid scrubbing your skin too hard.  The important point is to have the CHG soap sit on your skin for 3 minutes.    When the 3 minutes are up,  turn on the water and rinse the CHG solution off your body completely.   DO NOT wash with regular soap after you have used the CHG soap solution  Pat yourself dry with a clean, freshly-laundered towel.  DO NOT apply powders, deodorants, or lotions.  Dress in clean, freshly laundered nightclothes.    Be sure to sleep with clean, freshly laundered sheets.  Be aware that CHG will cause stains on fabrics; if you wash them with bleach after use.  Rinse your washcloth and other linens that have contact with CHG completely.  Use only non-chlorine detergents to launder the items used.   The morning of surgery is the fifth day.  Repeat the above steps and dress in clean comfortable clothing     Whom should I contact if I have any questions regarding the use of CHG soap?  Call the [facility] at [phone] if you have any questions.

## 2024-07-08 NOTE — CPM/PAT H&P
CPM/PAT Evaluation       Name: Rebekah Murillo (Rebekah Murillo)  /Age: 1955/68 y.o.     In-Person       Chief Complaint: Age-related osteoporosis with current pathological fracture of vertebra   HPI  A 68-year-old female with age-related osteoporosis with current pathological fracture of vertebra.  History of significant osteoporosis with previous upper extremity fractures.  Most recently, patient sustained acute pathological fracture while leaning over her luggage in which she felt sudden sharp back pain.  An MRI of the back on 2024 revealed: Acute to subacute T7 inferior endplate compression fracture with 50% vertebral height loss, Acute to subacute L1 compression fracture with 5 mm retropulsion resulting in mild spinal canal narrowing. 67% vertebral body height loss.  Denies fever, chills, chest pain, shortness of breath, syncope, lower extremity numbness or loss of bladder or bowel function.  She is scheduled for lumbar 1 kyphoplasty, lumbar 1 percutaneous vertebral augmentation.      Past Medical History:   Diagnosis Date    Acute bacterial conjunctivitis of both eyes 2023    Acute non-recurrent maxillary sinusitis 2023    Acute pain of right knee 2023    Arthritis unsure    Bronchitis, acute 2023    Chronic back pain     Chronic bronchitis (Multi) 25 years ago    1 x year    COPD (chronic obstructive pulmonary disease) (Multi)     Depression 30 years ago    medicated    Ex-smoker     quit 2023    Fractures (x2), ,     HL (hearing loss) 20 years ago    Osteoporosis     Urticaria, acute 2023    Vision loss 30 years ago    glasses       Past Surgical History:   Procedure Laterality Date    ELBOW SURGERY Left     EYE SURGERY Left     FINGER SURGERY Left     index    TUBAL LIGATION Bilateral          No Known Allergies    Current Outpatient Medications   Medication Sig Dispense Refill    ascorbic acid (Vitamin C) 1,000 mg tablet [facility] Vitamin C 1000 MG Oral  "Tablet   Refills: 0       Active      buPROPion XL (Wellbutrin XL) 150 mg 24 hr tablet Take 1 tablet (150 mg) by mouth once daily in the morning. Take before meals.      calcium carbonate (CALCIUM 600 ORAL) Take 1,200 mg by mouth 2 times a day.      cholecalciferol (Vitamin D-3) 125 MCG (5000 UT) capsule Take 1 capsule (125 mcg) by mouth once daily.      citalopram (CeleXA) 40 mg tablet Take 1 tablet (40 mg) by mouth once daily.      diclofenac (Voltaren) 75 mg EC tablet Take 1 tablet (75 mg) by mouth 2 times a day as needed (pain). Do not crush, chew, or split. 60 tablet 0    HYDROcodone-acetaminophen (Norco) 7.5-325 mg tablet Take 1 tablet by mouth 4 times a day as needed for severe pain (7 - 10) for up to 14 days. 56 tablet 0    multivitamin tablet Take 1 tablet by mouth once daily.      traZODone (Desyrel) 100 mg tablet 1-2 tablets per night      albuterol (Ventolin HFA) 90 mcg/actuation inhaler INHALE 1 TO 2 PUFFS EVERY 4 TO 6 HOURS AS NEEDED. 18 g 2    budesonide-formoteroL (Symbicort) 160-4.5 mcg/actuation inhaler Inhale 2 puffs 2 times a day. Rinse mouth with water after use to reduce aftertaste and incidence of candidiasis. Do not swallow.      chlorhexidine (Peridex) 0.12 % solution Use cap to measure 15 mL.  Swish/gargle mouthwash for at least 30 seconds.  Do not swallow.  Use night before surgery after brushing teeth and morning of surgery after brushing teeth. 473 mL 0    pen needle, diabetic (BD Ultra-Fine Short Pen Needle) 31 gauge x 5/16\" needle Use Forteo injection 30 each 11    teriparatide (Forteo) injection Inject 0.08 mL (20 mcg) under the skin once daily. 1 each 11     No current facility-administered medications for this visit.     Review of Systems   Constitutional:  Positive for activity change.   HENT: Negative.     Eyes: Negative.         Glasses   Respiratory:  Positive for cough (chronic).         Chronic CASTELLANOS   Cardiovascular: Negative.    Gastrointestinal: Negative.    Genitourinary: " Negative.    Musculoskeletal:  Positive for arthralgias, back pain and gait problem.   Skin: Negative.    Psychiatric/Behavioral: Negative.          Physical Exam  Vitals reviewed.   HENT:      Head: Normocephalic and atraumatic.      Mouth/Throat:      Mouth: Mucous membranes are moist.   Eyes:      Pupils: Pupils are equal, round, and reactive to light.      Comments: glasses   Cardiovascular:      Rate and Rhythm: Normal rate and regular rhythm.   Pulmonary:      Effort: Pulmonary effort is normal.      Breath sounds: Normal breath sounds.   Abdominal:      Palpations: Abdomen is soft.   Musculoskeletal:      Cervical back: Normal range of motion.      Comments: Decreased rom  in back,  back brace present   Skin:     General: Skin is warm and dry.   Neurological:      Mental Status: She is alert and oriented to person, place, and time.   Psychiatric:         Mood and Affect: Mood normal.          PAT AIRWAY:   Airway:     Mallampati::  II    Neck ROM::  Full  normal        /89   Pulse 67   Temp 37 °C (98.6 °F) (Temporal)   Ht 1.524 m (5')   Wt 59.3 kg (130 lb 11.2 oz)   SpO2 98%   BMI 25.53 kg/m²       Stop Bang Score 2   CHADS 2 score: 2.8%  DASI score: 15.45  METS score: 4.6  Revised cardiac risk index: 0.9%  ASA II  ARISCAT 1.6%  Labs done 5/28/2024 CBC, CMP  EKG done 5/28/2024  PAT orders MRSA    Assessment and Plan:     Age-related osteoporosis with current pathological fracture of vertebra Plan: Lumbar 1 kyphoplasty, lumbar 1 percutaneous vertebral augmentation  Depression/anxiety  Osteoporosis-severe follows with rheumatology  COPD  Ex-smoker quit 9/20/2023  BMI 25.02

## 2024-07-09 ENCOUNTER — ANESTHESIA EVENT (OUTPATIENT)
Dept: OPERATING ROOM | Facility: HOSPITAL | Age: 69
End: 2024-07-09
Payer: MEDICARE

## 2024-07-09 ENCOUNTER — HOSPITAL ENCOUNTER (OUTPATIENT)
Facility: HOSPITAL | Age: 69
Setting detail: OUTPATIENT SURGERY
Discharge: HOME | End: 2024-07-09
Attending: ANESTHESIOLOGY | Admitting: ANESTHESIOLOGY
Payer: MEDICARE

## 2024-07-09 ENCOUNTER — APPOINTMENT (OUTPATIENT)
Dept: RADIOLOGY | Facility: HOSPITAL | Age: 69
End: 2024-07-09
Payer: MEDICARE

## 2024-07-09 ENCOUNTER — ANESTHESIA (OUTPATIENT)
Dept: OPERATING ROOM | Facility: HOSPITAL | Age: 69
End: 2024-07-09
Payer: MEDICARE

## 2024-07-09 VITALS
HEART RATE: 65 BPM | RESPIRATION RATE: 16 BRPM | OXYGEN SATURATION: 94 % | SYSTOLIC BLOOD PRESSURE: 101 MMHG | DIASTOLIC BLOOD PRESSURE: 59 MMHG | TEMPERATURE: 97.2 F

## 2024-07-09 DIAGNOSIS — M80.08XD AGE-RELATED OSTEOPOROSIS WITH CURRENT PATHOLOGICAL FRACTURE OF VERTEBRA WITH ROUTINE HEALING, SUBSEQUENT ENCOUNTER: Primary | ICD-10-CM

## 2024-07-09 DIAGNOSIS — M80.08XA AGE-RELATED OSTEOPOROSIS WITH CURRENT PATHOLOGICAL FRACTURE, VERTEBRA(E), INITIAL ENCOUNTER FOR FRACTURE (MULTI): ICD-10-CM

## 2024-07-09 PROCEDURE — C1889 IMPLANT/INSERT DEVICE, NOC: HCPCS | Performed by: ANESTHESIOLOGY

## 2024-07-09 PROCEDURE — 2500000004 HC RX 250 GENERAL PHARMACY W/ HCPCS (ALT 636 FOR OP/ED): Mod: JZ | Performed by: ANESTHESIOLOGY

## 2024-07-09 PROCEDURE — 3600000003 HC OR TIME - INITIAL BASE CHARGE - PROCEDURE LEVEL THREE: Performed by: ANESTHESIOLOGY

## 2024-07-09 PROCEDURE — A22514 PR PERQ VERT AGMNTJ CAVITY CRTJ UNI/BI CANNULJ LMBR: Performed by: NURSE ANESTHETIST, CERTIFIED REGISTERED

## 2024-07-09 PROCEDURE — 7100000002 HC RECOVERY ROOM TIME - EACH INCREMENTAL 1 MINUTE: Performed by: ANESTHESIOLOGY

## 2024-07-09 PROCEDURE — 2500000004 HC RX 250 GENERAL PHARMACY W/ HCPCS (ALT 636 FOR OP/ED): Performed by: ANESTHESIOLOGY

## 2024-07-09 PROCEDURE — A22514 PR PERQ VERT AGMNTJ CAVITY CRTJ UNI/BI CANNULJ LMBR: Performed by: ANESTHESIOLOGY

## 2024-07-09 PROCEDURE — 3600000008 HC OR TIME - EACH INCREMENTAL 1 MINUTE - PROCEDURE LEVEL THREE: Performed by: ANESTHESIOLOGY

## 2024-07-09 PROCEDURE — 2500000005 HC RX 250 GENERAL PHARMACY W/O HCPCS: Performed by: ANESTHESIOLOGY

## 2024-07-09 PROCEDURE — 22514 PERQ VERTEBRAL AUGMENTATION: CPT | Performed by: ANESTHESIOLOGY

## 2024-07-09 PROCEDURE — 7100000009 HC PHASE TWO TIME - INITIAL BASE CHARGE: Performed by: ANESTHESIOLOGY

## 2024-07-09 PROCEDURE — 3700000002 HC GENERAL ANESTHESIA TIME - EACH INCREMENTAL 1 MINUTE: Performed by: ANESTHESIOLOGY

## 2024-07-09 PROCEDURE — 7100000001 HC RECOVERY ROOM TIME - INITIAL BASE CHARGE: Performed by: ANESTHESIOLOGY

## 2024-07-09 PROCEDURE — 2500000004 HC RX 250 GENERAL PHARMACY W/ HCPCS (ALT 636 FOR OP/ED): Performed by: NURSE ANESTHETIST, CERTIFIED REGISTERED

## 2024-07-09 PROCEDURE — 2780000003 HC OR 278 NO HCPCS: Performed by: ANESTHESIOLOGY

## 2024-07-09 PROCEDURE — 7100000010 HC PHASE TWO TIME - EACH INCREMENTAL 1 MINUTE: Performed by: ANESTHESIOLOGY

## 2024-07-09 PROCEDURE — 2720000007 HC OR 272 NO HCPCS: Performed by: ANESTHESIOLOGY

## 2024-07-09 PROCEDURE — 3700000001 HC GENERAL ANESTHESIA TIME - INITIAL BASE CHARGE: Performed by: ANESTHESIOLOGY

## 2024-07-09 DEVICE — IVAS SYSTEM, BONE CEMENT W/HALF DOSE VERTAPLEX MIER DELIVERY SYSTM W/O NEEDLE: Type: IMPLANTABLE DEVICE | Site: BACK | Status: FUNCTIONAL

## 2024-07-09 RX ORDER — FENTANYL CITRATE 50 UG/ML
50 INJECTION, SOLUTION INTRAMUSCULAR; INTRAVENOUS EVERY 5 MIN PRN
Status: DISCONTINUED | OUTPATIENT
Start: 2024-07-09 | End: 2024-07-09 | Stop reason: HOSPADM

## 2024-07-09 RX ORDER — SODIUM CHLORIDE, SODIUM LACTATE, POTASSIUM CHLORIDE, CALCIUM CHLORIDE 600; 310; 30; 20 MG/100ML; MG/100ML; MG/100ML; MG/100ML
100 INJECTION, SOLUTION INTRAVENOUS CONTINUOUS
Status: DISCONTINUED | OUTPATIENT
Start: 2024-07-09 | End: 2024-07-09 | Stop reason: HOSPADM

## 2024-07-09 RX ORDER — LIDOCAINE HYDROCHLORIDE AND EPINEPHRINE 10; 10 MG/ML; UG/ML
INJECTION, SOLUTION INFILTRATION; PERINEURAL AS NEEDED
Status: DISCONTINUED | OUTPATIENT
Start: 2024-07-09 | End: 2024-07-09 | Stop reason: HOSPADM

## 2024-07-09 RX ORDER — ALBUTEROL SULFATE 0.83 MG/ML
2.5 SOLUTION RESPIRATORY (INHALATION) ONCE AS NEEDED
Status: DISCONTINUED | OUTPATIENT
Start: 2024-07-09 | End: 2024-07-09 | Stop reason: HOSPADM

## 2024-07-09 RX ORDER — LABETALOL HYDROCHLORIDE 5 MG/ML
5 INJECTION, SOLUTION INTRAVENOUS ONCE AS NEEDED
Status: DISCONTINUED | OUTPATIENT
Start: 2024-07-09 | End: 2024-07-09 | Stop reason: HOSPADM

## 2024-07-09 RX ORDER — FENTANYL CITRATE 50 UG/ML
INJECTION, SOLUTION INTRAMUSCULAR; INTRAVENOUS AS NEEDED
Status: DISCONTINUED | OUTPATIENT
Start: 2024-07-09 | End: 2024-07-09

## 2024-07-09 RX ORDER — ONDANSETRON HYDROCHLORIDE 2 MG/ML
4 INJECTION, SOLUTION INTRAVENOUS ONCE AS NEEDED
Status: DISCONTINUED | OUTPATIENT
Start: 2024-07-09 | End: 2024-07-09 | Stop reason: HOSPADM

## 2024-07-09 RX ORDER — HYDRALAZINE HYDROCHLORIDE 20 MG/ML
5 INJECTION INTRAMUSCULAR; INTRAVENOUS EVERY 30 MIN PRN
Status: DISCONTINUED | OUTPATIENT
Start: 2024-07-09 | End: 2024-07-09 | Stop reason: HOSPADM

## 2024-07-09 RX ORDER — PROPOFOL 10 MG/ML
INJECTION, EMULSION INTRAVENOUS AS NEEDED
Status: DISCONTINUED | OUTPATIENT
Start: 2024-07-09 | End: 2024-07-09

## 2024-07-09 RX ORDER — MIDAZOLAM HYDROCHLORIDE 1 MG/ML
INJECTION, SOLUTION INTRAMUSCULAR; INTRAVENOUS AS NEEDED
Status: DISCONTINUED | OUTPATIENT
Start: 2024-07-09 | End: 2024-07-09

## 2024-07-09 RX ORDER — MEPERIDINE HYDROCHLORIDE 25 MG/ML
12.5 INJECTION INTRAMUSCULAR; INTRAVENOUS; SUBCUTANEOUS EVERY 10 MIN PRN
Status: DISCONTINUED | OUTPATIENT
Start: 2024-07-09 | End: 2024-07-09 | Stop reason: HOSPADM

## 2024-07-09 RX ORDER — LORAZEPAM 2 MG/ML
0.5 INJECTION INTRAMUSCULAR ONCE AS NEEDED
Status: DISCONTINUED | OUTPATIENT
Start: 2024-07-09 | End: 2024-07-09 | Stop reason: HOSPADM

## 2024-07-09 RX ORDER — BUPIVACAINE HYDROCHLORIDE 2.5 MG/ML
INJECTION, SOLUTION INFILTRATION; PERINEURAL AS NEEDED
Status: DISCONTINUED | OUTPATIENT
Start: 2024-07-09 | End: 2024-07-09 | Stop reason: HOSPADM

## 2024-07-09 RX ORDER — CEFAZOLIN SODIUM 2 G/100ML
2 INJECTION, SOLUTION INTRAVENOUS ONCE
Status: COMPLETED | OUTPATIENT
Start: 2024-07-09 | End: 2024-07-09

## 2024-07-09 SDOH — HEALTH STABILITY: MENTAL HEALTH: CURRENT SMOKER: 0

## 2024-07-09 ASSESSMENT — PAIN SCALES - GENERAL
PAINLEVEL_OUTOF10: 8
PAINLEVEL_OUTOF10: 5 - MODERATE PAIN
PAINLEVEL_OUTOF10: 5 - MODERATE PAIN
PAIN_LEVEL: 8
PAINLEVEL_OUTOF10: 5 - MODERATE PAIN
PAINLEVEL_OUTOF10: 8
PAINLEVEL_OUTOF10: 8

## 2024-07-09 ASSESSMENT — COLUMBIA-SUICIDE SEVERITY RATING SCALE - C-SSRS
2. HAVE YOU ACTUALLY HAD ANY THOUGHTS OF KILLING YOURSELF?: NO
6. HAVE YOU EVER DONE ANYTHING, STARTED TO DO ANYTHING, OR PREPARED TO DO ANYTHING TO END YOUR LIFE?: NO
1. IN THE PAST MONTH, HAVE YOU WISHED YOU WERE DEAD OR WISHED YOU COULD GO TO SLEEP AND NOT WAKE UP?: NO
6. HAVE YOU EVER DONE ANYTHING, STARTED TO DO ANYTHING, OR PREPARED TO DO ANYTHING TO END YOUR LIFE?: NO

## 2024-07-09 ASSESSMENT — PAIN DESCRIPTION - ORIENTATION: ORIENTATION: LOWER

## 2024-07-09 ASSESSMENT — PAIN DESCRIPTION - LOCATION: LOCATION: BACK

## 2024-07-09 ASSESSMENT — PAIN - FUNCTIONAL ASSESSMENT
PAIN_FUNCTIONAL_ASSESSMENT: 0-10

## 2024-07-09 NOTE — ANESTHESIA PREPROCEDURE EVALUATION
Patient: Rebekah Murillo    Procedure Information       Anesthesia Start Date/Time: 07/09/24 1349    Procedure: Kyphoplasty Lumbar 1, Lumbar 1 Percutaneous Vertebral Augmentation - C-ARM, VICKI - OLIVIA    Location: [facility] OR 05 / [facility]    Surgeons: Glenn Aleman MD          Past Medical History:   Diagnosis Date    Acute bacterial conjunctivitis of both eyes 09/20/2023    Acute non-recurrent maxillary sinusitis 09/20/2023    Acute pain of right knee 09/20/2023    Arthritis unsure    Bronchitis, acute 09/20/2023    Chronic back pain     Chronic bronchitis (Multi) 25 years ago    1 x year    COPD (chronic obstructive pulmonary disease) (Multi)     Depression 30 years ago    medicated    Ex-smoker     quit 9/2023    Fractures 2012(x2), 2016, 2024    HL (hearing loss) 20 years ago    Osteoporosis     Urticaria, acute 09/20/2023    Vision loss 30 years ago    glasses         Relevant Problems   Cardiac   (+) Combined hyperlipidemia   (+) Right-sided chest wall pain      Pulmonary   (+) COPD (chronic obstructive pulmonary disease) (Multi)   (+) COPD exacerbation (Multi)   (+) Lung nodules      Neuro   (+) Moderate single current episode of major depressive disorder (Multi)      Musculoskeletal   (+) Chronic low back pain     Past Surgical History:   Procedure Laterality Date    ELBOW SURGERY Left     EYE SURGERY Left     FINGER SURGERY Left     index    TUBAL LIGATION Bilateral          Clinical information reviewed:   Tobacco  Allergies  Meds   Med Hx  Surg Hx  OB Status  Fam Hx  Soc   Hx        NPO Detail:  NPO/Void Status  Date of Last Liquid: 07/08/24  Time of Last Liquid: 2345  Date of Last Solid: 07/08/24  Time of Last Solid: 2345  Time of Last Void: 1030         Physical Exam    Airway  Mallampati: II  TM distance: >3 FB  Neck ROM: full     Cardiovascular   Comments: deferred   Dental    Pulmonary   Comments: deferred   Abdominal     Comments: deferred           Anesthesia Plan    History of  general anesthesia?: yes  History of complications of general anesthesia?: no    ASA 3     MAC     The patient is not a current smoker.    intravenous induction   Postoperative administration of opioids is intended.  Anesthetic plan and risks discussed with patient.    Plan discussed with CRNA.

## 2024-07-09 NOTE — INTERVAL H&P NOTE
H&P reviewed. The patient was examined and there are no changes to the H&P. Rebekah Murillo is a 68 y.o. female with PMH COPD, former smoker who presents for  L1 percutaneous vertebral augmentation to target pain generator as seen on imaging and minimize risk/likelihood of chronic opioid use and/or surgery. Patient's pain stable and persistent from last visit.  Appropriately NPO.  No personal/family hx issues with anesthesia. Denies allergies to Latex, steroids, local anesthetics, or iodine/contrast. Denies being on blood thinners. Not diabetic.  Denies fever, chills, NS, CP, SOB, cough, N/V.    Discussed procedure risks/benefits in detail with patient. Pt meets medical necessity for procedure due to failure of conservative measures. Reviewed procedural risks including bleeding, infection, nerve damage, paralysis. Also reviewed mitigating factors such as screening for infection/blood thinner use, sterile precautions, and image-guidance when applicable. All questions answered. Pt/guardian expressed understanding and choose to proceed      Glenn Aleman MD  Anesthesiologist & Interventional Pain Physician   [facility]  O: [phone]  F: [phone]  12:47 PM  07/09/24

## 2024-07-09 NOTE — OP NOTE
Kyphoplasty Lumbar 1, Lumbar 1 Percutaneous Vertebral Augmentation Operative Note     Date: 2024  OR Location: [facility]    Name: Rebekah Murillo, : 1955, Age: 68 y.o., MRN: [id number], Sex: female    Diagnosis  Pre-op Diagnosis     * Age-related osteoporosis with current pathological fracture of vertebra with routine healing, subsequent encounter [M80.08XD]     * Age-related osteoporosis with current pathological fracture, vertebra(e), initial encounter for fracture (Multi) [M80.08XA] Post-op Diagnosis     * Age-related osteoporosis with current pathological fracture of vertebra with routine healing, subsequent encounter [M80.08XD]     * Age-related osteoporosis with current pathological fracture, vertebra(e), initial encounter for fracture (Multi) [M80.08XA]     Procedures  Kyphoplasty Lumbar 1, Lumbar 1 Percutaneous Vertebral Augmentation  39271 - KS PERQ VERT AGMNTJ CAVITY CRTJ UNI/BI CANNULJ LMBR      Surgeons      * Glenn Aleman - Primary    Resident/Fellow/Other Assistant:  Surgeons and Role:  * No surgeons found with a matching role *    Procedure Summary  Anesthesia: Monitor Anesthesia Care  ASA: III  Anesthesia Staff: Anesthesiologist: Kailash Caputo MD  CRNA: LISA Wing-HECTOR  Estimated Blood Loss: 5 mL  Intra-op Medications: Administrations occurring from 24 1627 to 07/10/24 1627:  * No intraprocedure medications in log *           Anesthesia Record               Intraprocedure I/O Totals          Intake    Propofol Drip 0.00 mL    The total shown is the total volume documented since Anesthesia Start was filed.    ceFAZolin in dextrose (iso-os) (Ancef) IVPB 2 g 100.00 mL    Total Intake 100 mL          Specimen: No specimens collected     Staff:   Circulator: Nolan Reed Person: Federica Reed Person: Delonte         Drains and/or Catheters: * None in log *    Tourniquet Times:         Implants:  Implants       Type Name Action Serial No.      Implant IVAS SYSTEM, BONE CEMENT  W/HALF DOSE VERTAPLEX ROMELIA DELIVERY SYSTM W/O NEEDLE - [id number] Implanted                 Indications: Rebekah Murillo is an 68 y.o. female who is having surgery for Age-related osteoporosis with current pathological fracture of vertebra with routine healing, subsequent encounter [M80.08XD]  Age-related osteoporosis with current pathological fracture, vertebra(e), initial encounter for fracture (Multi) [M80.08XA].     VERTEBRAL AUGMENTATION PROCEDURE NOTE:   L1 Spinejack under fluoroscopic guidance    PREPROCEDURAL DIAGNOSES: L1 Burst/Osteoporotic compression fracture  POSTPROCEDURAL DIAGNOSES: L1 Burst/Osteoporotic compression fracture    PROCEDURALIST: Glenn Aleman MD  ANESTHESIA: MAC  LOCATION: [facility] OR      INDICATION FOR PROCEDURE:       Persistent back pain at site of vertebral body insufficiency fracture, intractable, failed conservative medical management including: time, rest, activity modification, oral analgesics. Kyphoplasty/spinejack indicated to target pain generator as seen on imaging and minimize risk/likelihood of chronic opioid use and/or surgery         INFORMED CONSENT:    After reviewing the procedure with the patient, expected outcomes, potential benefits and procedural risks, informed consent to proceed with the injection was obtained.  The potential benefit of significantly decreased back pain was reviewed; however, the possibility of little to no pain relief resulting from the procedure was also discussed.  The patient was specifically made aware of risks involving spinal cord injury, nerve root injury, pedicle fracture, hematoma, pain at injection site, cement leakage, and vascular injury.     DESCRIPTION OF PROCEDURE:     Pt brought to the OR, placed in prone position, standard ASA monitors applied by anesthesia car provider. Pt was given preoperative antibiotics: 2 g cefazolin IV.      Patient's lumbar spine prepped with chloroprep and duraprep and draped in sterile fashion.  "The L1 vertebra was identified under fluoroscopy and the end plates squared off. Left L1 pedicle identified. Skin anesthetized with 1% lidocaine. A 3.5\" 22 G spinal needle was advanced under fluoro guidance to contact pedicle. After negative aspiration, additional 1% lidocaine was administered. Needle re-styletted and withdrawn. Then, skin puncture was made with a #11 blade.     An 11 G Jamshidi trocar with bevel tip stylet was placed percutaenously here and seated into the superolateral aspect of the left L1 pedicle.  Using a small mallet, the trocar was advanced slowly with AP and lateral fluoroscopic guidance to ensure intra-pedicular access without violation of foramen or canal. Thus, the  trochar was advanced to a terminal position in the posterior third portion of the L1 vertebral body.  Trocar stylet removed and pin replaced. Trocar then removed over pin with care to keep pin in place in posterior third of vertebral body.     Medium reamer then advanced over pin under live lateral fluoroscopy until tip positioned in posterior vertebral body. Pin removed. Reamer advanced again under live fluoro until in anterior fourth portion of vertebral body. Reamer core removed while working channel left in place. Template utilized to optimize channel for ivett placement. Template removed from working channel and replaced with radiolucent cannula plug to prepare for contralateral pedicle access.    Procedure was repeated at contralateral (right) L1 pedicle in similar fashion to optimize right-sided cavity for second ivett placement. Template replaced with medium size ivett. Contralateral (left) cannula plug replaced with medium size ivett. Fluoro utilized to confirm all steps.    AP utilized to verify bilateral jacks positioned symmetrically with superior skis parallel with superior endplate of operative vertebral body. Under live lateral fluoro, jacks intermittently deployed to elevate superior endplate until hard stop. "     Bilateral ivett screws removed with care not to deploy jacks. cement was prepared using Striker kit. Inserted cannula plug in left working cannula while bone filler placed in right side. Under live fluoro, right ivett/cavity filled with 1.5 ml cement showing excellent cephalo-caudad spread and interdigitation within the vertebral body without vascular uptake or violation of canal/foramen. Bone filler replaced with cannula plug while contralateral side was performed in similar fashion without issue to fill additional 1.5 ml cement. Bilateral spread was confirmed on AP views. No canal or foraminal spread of cement seen. Bone filler replaced with cannula plug to prevent retrograde cement flow.     Total cement: 3 cc    Spinejacks were then carefully deployed bilaterally. Adequate deployment and positioning confirmed in lateral and AP views. All instruments removed. Hemostasis achieved.     Incisions closed with Dermabond and dressed with liquid adhesive, Steri-strips, 2x2 gauze, Tegaderm.     The pt was transferred to the recovery room in stable condition and left supine for 30 min until the cement was cured. At no time during the procedure did the patient experience lower extremity pain or paresthesia.  The patient was neurologically intact post-op and the BLE were assessed in the recovery room.      The patient reported that back pain was improved after recovery and ambulation.  The patient was discharged home  in excellent condition with a family member.       DISPOSITION:     HOME  COMPLICATIONS: NONE        Glenn Aleman MD  Anesthesiologist & Interventional Pain Physician   [facility] [place]  O: [phone]  F: [phone]  3:22 PM  07/09/24

## 2024-07-09 NOTE — DISCHARGE INSTRUCTIONS
DISCHARGE INSTRUCTIONS FOR INJECTIONS     You underwent an L1 Spinejack today    Aftermost injections, it is recommended that you relax and limit your activity for the remainder of the day unless you have been told otherwise by your pain physician.  You should not drive a car, operate machinery, or make important legal decisions unless otherwise directed by your pain physician.  You may resume your normal activity, including exercise, tomorrow.      Keep a written pain diary of how much pain relief you experienced following the injection procedure and the length of time of pain relief you experienced pain relief. Following diagnostic injections like medial branch nerve blocks, sacroiliac joint blocks, stellate ganglion injections and other blocks, it is very important you record the specific amount of pain relief you experienced immediately after the injectionand how long it lasted. Your doctor will ask you for this information at your follow up visit.     For all injections, please keep the injection site dry and inspect the site for a couple of days. You may remove the Band-Aid the day of the injection at any time.     Some discomfort, bruising or slight swelling may occur at the injection site. This is not abnormal if it occurs.  If needed you may:    -Take over the counter medication such as Tylenol or Motrin.   -Apply an ice pack for 30 minutes, 2 to 3 times a day for the first 24 hours.     You may shower in 2 days; no soaking baths, hot tubs, whirlpools or swimming pools for two weeks.      If you are given steroids in your injection, it may take 3-5 days for the steroid medication to take effect. You may notice a worsening of your symptoms for 1-2 days after the injection. This is not abnormal.  You may use acetaminophen, ibuprofen, or prescription medication that your doctor may have prescribed for you if you need to do so.     A few common side effects of steroids include facial flushing, sweating,  restlessness, irritability,difficulty sleeping, increase in blood sugar, and increased blood pressure. If you have diabetes, please monitor your blood sugar at least once a day for at least 5 days. If you have poorly controlled high blood pressure, monitoryour blood pressure for at least 2 days and contact your primary care physician if these numbers are unusually high for you.      If you take aspirin or non-steroidal anti-inflammatory drugs (examples are Motrin, Advil, ibuprofen, Naprosyn, Voltaren, Relafen, etc.) you may restart these this evening, but stop taking it 3 days before your next appointment, unless instructed otherwiseby your physician.      You do not need to discontinue non-aspirin-containing pain medications prior to an injection (examples: Celebrex, tramadol, hydrocodone and acetaminophen).      If you take a blood thinning medication (Coumadin, Lovenox, Fragmin,Ticlid, Plavix, Pradaxa, etc.), please discuss this with your primary care physician/cardiologist and your pain physician. These medications MUST be discontinued before you can have an injection safely, without the risk of uncontrolled bleeding. If these medications are not discontinued for an appropriate period of time, you will not be able to receivean injection.      If you are taking Coumadin, please have your INR checked the morning of your procedure and bringthe result to your appointment unless otherwise instructed. If your INR is over 1.2, your injection may need to be rescheduled to avoid uncontrolled bleeding from the needle placement.     Call [facility] at [phone] between 8am-4pm Monday - Friday if you are experiencing the following:    If you received an epidural or spinal injection:    -Headache that doesnot go away with medicine, is worse when sitting or standing up, and is greatly relieved upon lying down.   -Severe pain worse than or different than your baseline pain.   -Chills or fever (101º F or  greater).   -Drainage or signs of infection at the injection site     Go directly to the Emergency Department if you are experiencing the following and received an epidural or spinal injection:   -Abrupt weakness or progressive weakness in your legs that starts after you leave the clinic.   -Abrupt severe or worsening numbness in your legs.   -Inability to urinate after the injection or loss of bowel or bladder control without the urge to defecate or urinate.     If you have a clinical question that cannot wait until your next appointment, please call [phone] between 8am-4pm Monday - Friday or send a [facility] message. We do our best to return all non-emergency messages within 24 hours, Monday - Friday. A nurse or physician will return your message.      If you need to cancel an appointment, please call the scheduling staff at [phone] during normal business hours or leave a message at least 24 hours in advance.     If you are going to be sedated for your next procedure, you MUST have responsible adult who can legally drive accompany you home. You cannot eat or drink for eight hours prior to the planned procedure if you are going to receive sedation. You may take your non-blood thinning medications with a small sip of water.

## 2024-07-09 NOTE — ANESTHESIA POSTPROCEDURE EVALUATION
Patient: Rebekah Murillo    Procedure Summary       Date: 07/09/24 Room / Location: [facility] 05 / [facility]    Anesthesia Start: 1349 Anesthesia Stop: 1526    Procedure: Kyphoplasty Lumbar 1, Lumbar 1 Percutaneous Vertebral Augmentation Diagnosis:       Age-related osteoporosis with current pathological fracture of vertebra with routine healing, subsequent encounter      Age-related osteoporosis with current pathological fracture, vertebra(e), initial encounter for fracture (Multi)      (Age-related osteoporosis with current pathological fracture of vertebra with routine healing, subsequent encounter [M80.08XD])      (Age-related osteoporosis with current pathological fracture, vertebra(e), initial encounter for fracture (Multi) [M80.08XA])    Surgeons: Glenn Aleman MD Responsible Provider: Kailash Caputo MD    Anesthesia Type: MAC ASA Status: 3            Anesthesia Type: MAC    Vitals Value Taken Time   BP 88/69 07/09/24 1541   Temp 36.2 °C (97.2 °F) 07/09/24 1525   Pulse 71 07/09/24 1545   Resp 16 07/09/24 1545   SpO2 94 % 07/09/24 1542   Vitals shown include unfiled device data.    Anesthesia Post Evaluation    Patient location during evaluation: PACU  Patient participation: complete - patient participated  Level of consciousness: awake and alert  Pain score: 8  Pain management: inadequate  Multimodal analgesia pain management approach  Airway patency: patent  Two or more strategies used to mitigate risk of obstructive sleep apnea  Cardiovascular status: acceptable and blood pressure returned to baseline  Respiratory status: acceptable  Hydration status: acceptable  Postoperative Nausea and Vomiting: none  Comments: Pain management difficult to control, will consider augmenting with benzodiazapine.        No notable events documented.

## 2024-07-09 NOTE — POST-PROCEDURE NOTE
1615- pt brought back from pacu,verbal report received. Pt is alert and awake. Snack and drink given.  called.    1635- pt ambulated to BR with steady gait.     1640-vss. Discharge instructions given and explained to pt and . Both verbally understood. Pt tolerating snack and drink.    1645- pt getting dressed at this time with assistance of .    1656- transport at bedside for discharge.

## 2024-07-10 LAB — STAPHYLOCOCCUS SPEC CULT: NORMAL

## 2024-07-15 ENCOUNTER — SPECIALTY PHARMACY (OUTPATIENT)
Dept: PHARMACY | Facility: CLINIC | Age: 69
End: 2024-07-15

## 2024-07-15 PROCEDURE — RXMED WILLOW AMBULATORY MEDICATION CHARGE

## 2024-07-16 ENCOUNTER — PHARMACY VISIT (OUTPATIENT)
Dept: PHARMACY | Facility: CLINIC | Age: 69
End: 2024-07-16
Payer: MEDICARE

## 2024-07-24 ENCOUNTER — OFFICE VISIT (OUTPATIENT)
Dept: PAIN MEDICINE | Facility: CLINIC | Age: 69
End: 2024-07-24
Payer: MEDICARE

## 2024-07-24 VITALS
HEART RATE: 71 BPM | RESPIRATION RATE: 18 BRPM | WEIGHT: 130 LBS | SYSTOLIC BLOOD PRESSURE: 102 MMHG | DIASTOLIC BLOOD PRESSURE: 58 MMHG | HEIGHT: 60 IN | BODY MASS INDEX: 25.52 KG/M2

## 2024-07-24 DIAGNOSIS — Z98.890 S/P KYPHOPLASTY: ICD-10-CM

## 2024-07-24 DIAGNOSIS — G89.29 CHRONIC LOW BACK PAIN, UNSPECIFIED BACK PAIN LATERALITY, UNSPECIFIED WHETHER SCIATICA PRESENT: Primary | ICD-10-CM

## 2024-07-24 DIAGNOSIS — M47.816 LUMBAR SPONDYLOSIS: ICD-10-CM

## 2024-07-24 DIAGNOSIS — M79.18 DIFFUSE MYOFASCIAL PAIN SYNDROME: ICD-10-CM

## 2024-07-24 DIAGNOSIS — M54.50 CHRONIC LOW BACK PAIN, UNSPECIFIED BACK PAIN LATERALITY, UNSPECIFIED WHETHER SCIATICA PRESENT: Primary | ICD-10-CM

## 2024-07-24 PROCEDURE — 99214 OFFICE O/P EST MOD 30 MIN: CPT | Performed by: ANESTHESIOLOGY

## 2024-07-24 PROCEDURE — 3008F BODY MASS INDEX DOCD: CPT | Performed by: ANESTHESIOLOGY

## 2024-07-24 PROCEDURE — 1159F MED LIST DOCD IN RCRD: CPT | Performed by: ANESTHESIOLOGY

## 2024-07-24 PROCEDURE — 1160F RVW MEDS BY RX/DR IN RCRD: CPT | Performed by: ANESTHESIOLOGY

## 2024-07-24 ASSESSMENT — ENCOUNTER SYMPTOMS
RESPIRATORY NEGATIVE: 1
BACK PAIN: 1
PSYCHIATRIC NEGATIVE: 1
HEMATOLOGIC/LYMPHATIC NEGATIVE: 1
GASTROINTESTINAL NEGATIVE: 1
ENDOCRINE NEGATIVE: 1
EYES NEGATIVE: 1
CARDIOVASCULAR NEGATIVE: 1
CONSTITUTIONAL NEGATIVE: 1

## 2024-07-24 ASSESSMENT — PAIN SCALES - GENERAL
PAINLEVEL: 5
PAINLEVEL_OUTOF10: 5 - MODERATE PAIN

## 2024-07-24 ASSESSMENT — PATIENT HEALTH QUESTIONNAIRE - PHQ9
1. LITTLE INTEREST OR PLEASURE IN DOING THINGS: NOT AT ALL
SUM OF ALL RESPONSES TO PHQ9 QUESTIONS 1 AND 2: 0
2. FEELING DOWN, DEPRESSED OR HOPELESS: NOT AT ALL

## 2024-07-24 ASSESSMENT — PAIN DESCRIPTION - DESCRIPTORS: DESCRIPTORS: ACHING

## 2024-07-24 ASSESSMENT — PAIN - FUNCTIONAL ASSESSMENT: PAIN_FUNCTIONAL_ASSESSMENT: 0-10

## 2024-07-24 NOTE — PROGRESS NOTES
PAIN MANAGEMENT FOLLOW-UP OFFICE NOTE    Date of Service: 7/24/2024    SUBJECTIVE    CHIEF COMPLAINT: LBP    HISTORY OF PRESENT ILLNESS    Rebekah Murillo is a 68 y.o. female with PMH COPD, former smoker who presents for F/U    On 7/9, pt underwent L1 kyphoplasty without issue. Since that time, pt complains fo confounding LBP below original VCF pain limiting standing/walking    Pt denies new-onset numbness, weakness, bowel/bladder incontinence.  Pt denies recent infection, allergy to Latex/iodine/contrast. Patient is currently taking the following blood thinner(s): N/A    Procedure log:  -L1 kypho 7/9/24    REVIEW OF SYSTEMS  Review of Systems   Constitutional: Negative.    HENT: Negative.     Eyes: Negative.    Respiratory: Negative.     Cardiovascular: Negative.    Gastrointestinal: Negative.    Endocrine: Negative.    Musculoskeletal:  Positive for back pain and gait problem.   Skin: Negative.    Hematological: Negative.    Psychiatric/Behavioral: Negative.         PAST MEDICAL HISTORY  Past Medical History:   Diagnosis Date    Acute bacterial conjunctivitis of both eyes 09/20/2023    Acute non-recurrent maxillary sinusitis 09/20/2023    Acute pain of right knee 09/20/2023    Arthritis unsure    Bronchitis, acute 09/20/2023    Chronic back pain     Chronic bronchitis (Multi) 25 years ago    1 x year    COPD (chronic obstructive pulmonary disease) (Multi)     Depression 30 years ago    medicated    Ex-smoker     quit 9/2023    Fractures 2012(x2), 2016, 2024    HL (hearing loss) 20 years ago    Osteoporosis     Urticaria, acute 09/20/2023    Vision loss 30 years ago    glasses     Past Surgical History:   Procedure Laterality Date    ELBOW SURGERY Left     EYE SURGERY Left     FINGER SURGERY Left     index    TUBAL LIGATION Bilateral      Family History   Problem Relation Name Age of Onset    Arthritis Mother Samantha Musa     Hearing loss Mother Samantha Musa     Heart disease Mother Samantha Musa     Vision loss Mother  "Samantha Lencho     Alcohol abuse Father Gurvinder Musa -       Cancer Father Gurvinder Musa -       Cancer Maternal Grandfather Nolan Valero - Dec.     COPD Maternal Grandfather Nolan Valero - Dec.     Vision loss Maternal Grandfather Nolan Valero - Dec.     Hearing loss Maternal Grandmother Josselin Buenoshaw - Dec     Vision loss Maternal Grandmother Josselin Englishaw - Dec     Alcohol abuse Paternal Grandmother Rebekah Musa -         CURRENT MEDICATIONS  Current Outpatient Medications   Medication Sig Dispense Refill    albuterol (Ventolin HFA) 90 mcg/actuation inhaler INHALE 1 TO 2 PUFFS EVERY 4 TO 6 HOURS AS NEEDED. 18 g 2    ascorbic acid (Vitamin C) 1,000 mg tablet [facility] Vitamin C 1000 MG Oral Tablet   Refills: 0       Active      budesonide-formoteroL (Symbicort) 160-4.5 mcg/actuation inhaler Inhale 2 puffs 2 times a day. Rinse mouth with water after use to reduce aftertaste and incidence of candidiasis. Do not swallow.      buPROPion XL (Wellbutrin XL) 150 mg 24 hr tablet Take 1 tablet (150 mg) by mouth once daily in the morning. Take before meals.      calcium carbonate (CALCIUM 600 ORAL) Take 1,200 mg by mouth 2 times a day.      cholecalciferol (Vitamin D-3) 125 MCG (5000 UT) capsule Take 1,000 Units by mouth 2 times a day.      citalopram (CeleXA) 40 mg tablet Take 1 tablet (40 mg) by mouth once daily.      multivitamin tablet Take 1 tablet by mouth once daily.      pen needle, diabetic (BD Ultra-Fine Short Pen Needle) 31 gauge x 5/16\" needle Use 1 pen needle as directed once daily with Foreto injection. 30 each 11    teriparatide (Forteo) injection Inject 0.08 mL (20 mcg) under the skin once daily. Discard pen 28 days after first use. 1 each 11    traZODone (Desyrel) 100 mg tablet 1-2 tablets per night      chlorhexidine (Peridex) 0.12 % solution Use cap to measure 15 mL.  Swish/gargle mouthwash for at least 30 seconds.  Do not swallow.  Use night before surgery after brushing teeth " and morning of surgery after brushing teeth. (Patient not taking: Reported on 7/24/2024) 473 mL 0    diclofenac (Voltaren) 75 mg EC tablet Take 1 tablet (75 mg) by mouth 2 times a day as needed (pain). Do not crush, chew, or split. (Patient not taking: Reported on 7/24/2024) 60 tablet 0     No current facility-administered medications for this visit.       ALLERGIES AND DRUG REACTIONS  No Known Allergies       OBJECTIVE  Visit Vitals  /58   Pulse 71   Resp 18   Ht 1.524 m (5')   Wt 59 kg (130 lb)   BMI 25.39 kg/m²   OB Status Postmenopausal   Smoking Status Former   BSA 1.58 m²       Last Recorded Pain Score (if available):                Physical Exam  Vitals and nursing note reviewed.     General: Sitting in chair, NAD  Head: NCAT  Eyes: Sclera/conjunctiva clear, EOMI, PERRL  Nose/mouth: MMM  CV: Good distal pulses  Lungs: Good/equal chest excursion  Abdomen: Soft, ND  Ext: No cyanosis/edema  MSK: L-spine alignment: thoracic kyphosis, BL paraspinal m TTP, L-spine ROM: extension with +facet-loading BL    Neuro: AAOx3   Dermatome sensation to light touch  LEFT L1 (lower pelvis/upper thigh): WNL    RIGHT L1: WNL      LEFT L2 (upper thigh): WNL       RIGHT: L2:WNL      LEFT L3 (medial knee): WNL       RIGHT L3: WNL      LEFT L4 (superior medial malleolus): WNL       RIGHT L4: WNL      LEFT L5 (dorsal foot): WNL       RIGHT L5: WNL      LEFT S1 (lateral foot): WNL     RIGHT S1: WNL      LEFT S2 (popliteal fossa): WNL    RIGHT S2: WNL        Motor strength  LEFT L2 (hip flexion): 5/5   RIGHT L2: 5/5  LEFT L3 (knee extension): 5/5     RIGHT L3: 5/5  LEFT L4 (dorsiflexion): 5/5     RIGHT L4: 5/5  LEFT L5 (EHL extension): 5/5     RIGHT L5: 5/5  LEFT S1 (plantarflexion): 5/5     RIGHT S1: 5/5  LEFT S2 (knee flexion): 5/5      RIGHT S2: 5/5    Special testing  DTR diminished patellar reflexes BL    Psych: affect nl  Skin: no rash/lesions      REVIEW OF LABORATORY DATA  I have reviewed the following lab results:  WBC  "  Date Value Ref Range Status   06/09/2022 11.4 (H) 4.4 - 11.3 x10E9/L Final     RBC   Date Value Ref Range Status   06/09/2022 4.63 4.00 - 5.20 x10E12/L Final     Hemoglobin   Date Value Ref Range Status   06/09/2022 15.2 12.0 - 16.0 g/dL Final     Hematocrit   Date Value Ref Range Status   06/09/2022 45.3 36.0 - 46.0 % Final     MCV   Date Value Ref Range Status   06/09/2022 98 80 - 100 fL Final     MCHC   Date Value Ref Range Status   06/09/2022 33.6 32.0 - 36.0 g/dL Final     RDW   Date Value Ref Range Status   06/09/2022 12.5 11.5 - 14.5 % Final     Platelets   Date Value Ref Range Status   06/09/2022 203 150 - 450 x10E9/L Final     Sodium   Date Value Ref Range Status   06/09/2022 138 136 - 145 mmol/L Final     Potassium   Date Value Ref Range Status   06/09/2022 4.1 3.5 - 5.3 mmol/L Final     Bicarbonate   Date Value Ref Range Status   06/09/2022 29 21 - 32 mmol/L Final     Urea Nitrogen   Date Value Ref Range Status   06/09/2022 11 6 - 23 mg/dL Final     Calcium   Date Value Ref Range Status   06/09/2022 9.4 8.6 - 10.3 mg/dL Final     No results found for: \"PROTIME\", \"PTT\", \"INR\", \"FIBRINOGEN\"      REVIEW OF RADIOLOGY   I have reviewed the following:  Radiology Studies           MRI L[facility]spine 5/28/24 [facility]         ASSESSMENT & PLAN  Rebekah Murillo is a 68 y.o. old female with PMH COPD, former smoker who presents for F/U LB pain    1) LBP  -worse with standing/walking and reproduced with point and percussion tenderness over thoracolumbar spine likely 2/2 L1 burst fracture likely 2/2 osteoporosis  -Refractive to 1 mo conservative tx including Tylenol, diclofenac, back brace, Norco  -MRI L-spine 5/28/24: acute T7 50% VCF and 67% L1 burst fx with retropulsion resulting in mild spinal canal narrowing. Left L1 pedicle 6.4 mm wide, right 7 mm. Multilevel spondylosis  -s/p L1 kypho 7/9 confounded by distinctly different musculoskeletal pain  -Schedule prognostic medial branch nerve blocks of BL L4-5, L5-S1 " facets with local anesthetic only under fluoroscopic guidance to assess candidacy for RFA    2) Myofascial pain  -Spastic BL paraspinal m TTP with palpable TP  -Schedule TPI        Discussed procedure risks/benefits in detail with patient. Pt meets medical necessity for procedure due to failure of conservative measures. Reviewed procedural risks including bleeding, infection, nerve damage, paralysis. Also reviewed mitigating factors such as screening for infection/blood thinner use, sterile precautions, and image-guidance when applicable. All questions answered. Pt/guardian expressed understanding and choose to proceed           Glenn Aleman MD  Anesthesiologist & Interventional Pain Physician   [facility] [place]  O: [phone]  F: [phone]  2:02 PM  07/24/24

## 2024-07-26 ENCOUNTER — APPOINTMENT (OUTPATIENT)
Dept: PAIN MEDICINE | Facility: CLINIC | Age: 69
End: 2024-07-26
Payer: MEDICARE

## 2024-07-26 PROBLEM — M79.18 DIFFUSE MYOFASCIAL PAIN SYNDROME: Status: ACTIVE | Noted: 2024-07-26

## 2024-07-29 ENCOUNTER — HOSPITAL ENCOUNTER (OUTPATIENT)
Dept: RADIOLOGY | Facility: HOSPITAL | Age: 69
Discharge: HOME | End: 2024-07-29
Payer: MEDICARE

## 2024-07-29 ENCOUNTER — TELEPHONE (OUTPATIENT)
Dept: PAIN MEDICINE | Facility: CLINIC | Age: 69
End: 2024-07-29
Payer: MEDICARE

## 2024-07-29 DIAGNOSIS — M54.50 ACUTE MIDLINE LOW BACK PAIN WITHOUT SCIATICA: ICD-10-CM

## 2024-07-29 DIAGNOSIS — M54.50 ACUTE MIDLINE LOW BACK PAIN WITHOUT SCIATICA: Primary | ICD-10-CM

## 2024-07-29 PROCEDURE — 72100 X-RAY EXAM L-S SPINE 2/3 VWS: CPT

## 2024-07-29 PROCEDURE — 72100 X-RAY EXAM L-S SPINE 2/3 VWS: CPT | Performed by: RADIOLOGY

## 2024-07-29 NOTE — TELEPHONE ENCOUNTER
"Pt calls stating \"I've done something else to my back.\" She was doing ok and then started doing her exercises again and she leaned forward and the pain began. She has an appointment with you on 08/07/2024 for a TPI. She wonders if she needs another x-ray to rule out another fracture; \"I had a lumbar x-ray on June 11 and maybe you could use this a a point of reference.\" Also asking for something to decrease her 7/10 pain.  Please advise.  "

## 2024-07-30 RX ORDER — HYDROCODONE BITARTRATE AND ACETAMINOPHEN 7.5; 325 MG/1; MG/1
1 TABLET ORAL 4 TIMES DAILY PRN
Qty: 36 TABLET | Refills: 0 | Status: SHIPPED | OUTPATIENT
Start: 2024-07-30 | End: 2024-08-08

## 2024-08-06 ENCOUNTER — TELEMEDICINE CLINICAL SUPPORT (OUTPATIENT)
Dept: PHARMACY | Facility: HOSPITAL | Age: 69
End: 2024-08-06
Payer: MEDICARE

## 2024-08-06 DIAGNOSIS — M80.00XA AGE-RELATED OSTEOPOROSIS WITH CURRENT PATHOLOGICAL FRACTURE, INITIAL ENCOUNTER: ICD-10-CM

## 2024-08-06 RX ORDER — TERIPARATIDE 250 UG/ML
20 INJECTION, SOLUTION SUBCUTANEOUS DAILY
Qty: 1 EACH | Refills: 10 | Status: SHIPPED | OUTPATIENT
Start: 2024-08-06

## 2024-08-06 NOTE — PROGRESS NOTES
[facility] Clinical Note    Rebekah Murillo is a 68 y.o. female, who is on the specialty pharmacy service for management of:  Osteoporosis Core.    Rebekah Murillo is taking: teriparatide 20 mcg daily.    Medication Receipt Date: 7/17/24  Medication Start Date (planned or actual): Patient believes she started once she received    Rebekah was contacted on 8/6/2024 at 2:40 PM for a virtual pharmacy visit with encounter number [id number] from:   [facility]  [address]  [facility] [address]  [facility] [address]  Dept: [phone]  Dept Fax: [phone]  Loc: [phone]    Rebekah was offered a Telemedicine Video visit or Telephone visit.  Rebekah consented to a telephone visit, which was performed.    The most recent encounter visit with the referring prescriber Dr. Shabana Guerrero on 7/1/24  was reviewed.  Pharmacy will continue to collaborate in the care of this patient with the referring prescriber Dr. Shabana Guerrero.    General Assessment  Spoke with Rebekah to complete teriparatide FFA. She states her injections have been going well other occasional brusing with injections, encouraged to rotate injection sites. Reports no other side effects at this time. Sent  Specialty Welcome packet via [employer].     Impression/Plan  IMPRESSION/PLAN:  Is patient high risk (potential patients:  pregnancy, geriatric, pediatric)?  No  Is laboratory follow-up needed? Completed in July   Is a clinical intervention needed? No  Next reassessment date? ~ 6 months   Additional comments: Telepharmacy pt, sent new teriparatide rx to [facility]     Refer to the encounter summary report for documentation details about patient counseling and education.      Medication Adherence    The importance of adherence was discussed with the patient and they were advised to take the medication as prescribed by their provider. Patient was encouraged to call their physician's office if they  have a question regarding a missed dose.     QOL/Patient Satisfaction  Rate your quality of life on scale of 1-10: -- (Patient states her teriparatide injections are going well other than occasional bruising)  Rate your satisfaction with  Specialty Pharmacy on scale of 1-10: -- (No concerns with SP, appreciated the call)      Patient was advised to contact the pharmacy if there are any changes to their medication list, including prescriptions, OTC medications, herbal products, or supplements. Patient was advised of [facility]'s dispensing process, refill timeline, contact information ([phone]), and patient management follow up. Patient confirmed understanding of education conducted during assessment. All patient questions and concerns were addressed to the best of my ability. Patient was encouraged to contact the specialty pharmacy with any questions or concerns.    Confirmed follow-up outreaches are properly scheduled and reviewed goals of therapy with the patient.        Velia Foster, MaryellenD

## 2024-08-06 NOTE — PROGRESS NOTES
[facility] needs new teriparatide rx for telepharmacy , will adjust refills for the 1 fill dispensed

## 2024-08-07 ENCOUNTER — OFFICE VISIT (OUTPATIENT)
Dept: PAIN MEDICINE | Facility: CLINIC | Age: 69
End: 2024-08-07
Payer: MEDICARE

## 2024-08-07 VITALS
HEIGHT: 60 IN | WEIGHT: 130 LBS | DIASTOLIC BLOOD PRESSURE: 62 MMHG | HEART RATE: 66 BPM | RESPIRATION RATE: 18 BRPM | BODY MASS INDEX: 25.52 KG/M2 | SYSTOLIC BLOOD PRESSURE: 104 MMHG

## 2024-08-07 DIAGNOSIS — M79.18 DIFFUSE MYOFASCIAL PAIN SYNDROME: ICD-10-CM

## 2024-08-07 PROCEDURE — 20553 NJX 1/MLT TRIGGER POINTS 3/>: CPT | Performed by: ANESTHESIOLOGY

## 2024-08-07 PROCEDURE — RXMED WILLOW AMBULATORY MEDICATION CHARGE

## 2024-08-07 PROCEDURE — 1125F AMNT PAIN NOTED PAIN PRSNT: CPT | Performed by: ANESTHESIOLOGY

## 2024-08-07 PROCEDURE — 3008F BODY MASS INDEX DOCD: CPT | Performed by: ANESTHESIOLOGY

## 2024-08-07 PROCEDURE — 1160F RVW MEDS BY RX/DR IN RCRD: CPT | Performed by: ANESTHESIOLOGY

## 2024-08-07 PROCEDURE — 1159F MED LIST DOCD IN RCRD: CPT | Performed by: ANESTHESIOLOGY

## 2024-08-07 ASSESSMENT — PAIN - FUNCTIONAL ASSESSMENT: PAIN_FUNCTIONAL_ASSESSMENT: 0-10

## 2024-08-07 ASSESSMENT — PATIENT HEALTH QUESTIONNAIRE - PHQ9
2. FEELING DOWN, DEPRESSED OR HOPELESS: NOT AT ALL
1. LITTLE INTEREST OR PLEASURE IN DOING THINGS: NOT AT ALL
SUM OF ALL RESPONSES TO PHQ9 QUESTIONS 1 AND 2: 0

## 2024-08-07 ASSESSMENT — PAIN SCALES - GENERAL
PAINLEVEL_OUTOF10: 8
PAINLEVEL: 8

## 2024-08-07 ASSESSMENT — PAIN DESCRIPTION - DESCRIPTORS: DESCRIPTORS: ACHING

## 2024-08-09 ENCOUNTER — SPECIALTY PHARMACY (OUTPATIENT)
Dept: PHARMACY | Facility: CLINIC | Age: 69
End: 2024-08-09

## 2024-08-09 ASSESSMENT — ENCOUNTER SYMPTOMS
PSYCHIATRIC NEGATIVE: 1
BACK PAIN: 1
CARDIOVASCULAR NEGATIVE: 1
EYES NEGATIVE: 1
GASTROINTESTINAL NEGATIVE: 1
HEMATOLOGIC/LYMPHATIC NEGATIVE: 1
RESPIRATORY NEGATIVE: 1
CONSTITUTIONAL NEGATIVE: 1
ENDOCRINE NEGATIVE: 1

## 2024-08-09 NOTE — PROGRESS NOTES
PAIN MANAGEMENT FOLLOW-UP OFFICE NOTE    Date of Service: 24    SUBJECTIVE    CHIEF COMPLAINT: LBP    HISTORY OF PRESENT ILLNESS    Rebekah Murillo is a 68 y.o. female with PMH COPD, former smoker who presents for lumbar TPI    Pt denies new-onset numbness, weakness, bowel/bladder incontinence.  Pt denies recent infection, allergy to Latex/iodine/contrast. Patient is currently taking the following blood thinner(s): N/A    Procedure log:  -L1 kypho 24    REVIEW OF SYSTEMS  Review of Systems   Constitutional: Negative.    HENT: Negative.     Eyes: Negative.    Respiratory: Negative.     Cardiovascular: Negative.    Gastrointestinal: Negative.    Endocrine: Negative.    Musculoskeletal:  Positive for back pain and gait problem.   Skin: Negative.    Hematological: Negative.    Psychiatric/Behavioral: Negative.         PAST MEDICAL HISTORY  Past Medical History:   Diagnosis Date    Acute bacterial conjunctivitis of both eyes 2023    Acute non-recurrent maxillary sinusitis 2023    Acute pain of right knee 2023    Arthritis unsure    Bronchitis, acute 2023    Chronic back pain     Chronic bronchitis (Multi) 25 years ago    1 x year    COPD (chronic obstructive pulmonary disease) (Multi)     Depression 30 years ago    medicated    Ex-smoker     quit 2023    Fractures (x2), ,     HL (hearing loss) 20 years ago    Osteoporosis     Urticaria, acute 2023    Vision loss 30 years ago    glasses     Past Surgical History:   Procedure Laterality Date    ELBOW SURGERY Left     EYE SURGERY Left     FINGER SURGERY Left     index    TUBAL LIGATION Bilateral      Family History   Problem Relation Name Age of Onset    Arthritis Mother Samantha Musa     Hearing loss Mother Samantha Musa     Heart disease Mother Samantha Musa     Vision loss Mother Samantha Musa     Alcohol abuse Father Gurvinder Musa -       Cancer Father Gurvinder Musa -       Cancer Maternal Grandfather Nolan  "Valero - Dec.     COPD Maternal Grandfather Nolan Valero - Dec.     Vision loss Maternal Grandfather Nolan Valero - Dec.     Hearing loss Maternal Grandmother Josselin Buenoshaw - Dec     Vision loss Maternal Grandmother Josselin Buenoshaw - Dec     Alcohol abuse Paternal Grandmother Rebekah Musa -         CURRENT MEDICATIONS  Current Outpatient Medications   Medication Sig Dispense Refill    albuterol (Ventolin HFA) 90 mcg/actuation inhaler INHALE 1 TO 2 PUFFS EVERY 4 TO 6 HOURS AS NEEDED. 18 g 2    ascorbic acid (Vitamin C) 1,000 mg tablet [facility] Vitamin C 1000 MG Oral Tablet   Refills: 0       Active      budesonide-formoteroL (Symbicort) 160-4.5 mcg/actuation inhaler Inhale 2 puffs 2 times a day. Rinse mouth with water after use to reduce aftertaste and incidence of candidiasis. Do not swallow.      buPROPion XL (Wellbutrin XL) 150 mg 24 hr tablet Take 2 tablets (300 mg) by mouth once daily in the morning. Take before meals.      calcium carbonate (CALCIUM 600 ORAL) Take 1,200 mg by mouth 2 times a day.      cholecalciferol (Vitamin D-3) 125 MCG (5000 UT) capsule Take 1,000 Units by mouth 2 times a day.      citalopram (CeleXA) 40 mg tablet Take 1 tablet (40 mg) by mouth once daily.      multivitamin tablet Take 1 tablet by mouth once daily.      pen needle, diabetic (BD Ultra-Fine Short Pen Needle) 31 gauge x 5/16\" needle Use 1 pen needle as directed once daily with Foreto injection. 30 each 11    teriparatide (Forteo) injection Inject 0.08 mL (20 mcg) under the skin once daily. Discard pen 28 days after first use. 1 each 10    traZODone (Desyrel) 100 mg tablet 1-2 tablets per night       No current facility-administered medications for this visit.       ALLERGIES AND DRUG REACTIONS  Allergies   Allergen Reactions    Adhesive Tape-Silicones Rash          OBJECTIVE  Visit Vitals  /62   Pulse 66   Resp 18   Ht 1.524 m (5')   Wt 59 kg (130 lb)   BMI 25.39 kg/m²   OB Status Postmenopausal   Smoking " "Status Former   BSA 1.58 m²       Last Recorded Pain Score (if available):                Physical Exam  Vitals and nursing note reviewed.       General: Sitting in chair, NAD  Head: NCAT  Eyes: Sclera/conjunctiva clear, EOMI, PERRL  Nose/mouth: MMM  CV: Good distal pulses  Lungs: Good/equal chest excursion  Abdomen: Soft, ND  Ext: No cyanosis/edema  MSK: L-spine alignment: thoracic kyphosis, BL paraspinal m TTP with palpable TP    Neuro: AAOx3, CN grossly nl  Psych: affect nl  Skin: no rash/lesions      REVIEW OF LABORATORY DATA  I have reviewed the following lab results:  WBC   Date Value Ref Range Status   06/09/2022 11.4 (H) 4.4 - 11.3 x10E9/L Final     RBC   Date Value Ref Range Status   06/09/2022 4.63 4.00 - 5.20 x10E12/L Final     Hemoglobin   Date Value Ref Range Status   06/09/2022 15.2 12.0 - 16.0 g/dL Final     Hematocrit   Date Value Ref Range Status   06/09/2022 45.3 36.0 - 46.0 % Final     MCV   Date Value Ref Range Status   06/09/2022 98 80 - 100 fL Final     MCHC   Date Value Ref Range Status   06/09/2022 33.6 32.0 - 36.0 g/dL Final     RDW   Date Value Ref Range Status   06/09/2022 12.5 11.5 - 14.5 % Final     Platelets   Date Value Ref Range Status   06/09/2022 203 150 - 450 x10E9/L Final     Sodium   Date Value Ref Range Status   06/09/2022 138 136 - 145 mmol/L Final     Potassium   Date Value Ref Range Status   06/09/2022 4.1 3.5 - 5.3 mmol/L Final     Bicarbonate   Date Value Ref Range Status   06/09/2022 29 21 - 32 mmol/L Final     Urea Nitrogen   Date Value Ref Range Status   06/09/2022 11 6 - 23 mg/dL Final     Calcium   Date Value Ref Range Status   06/09/2022 9.4 8.6 - 10.3 mg/dL Final     No results found for: \"PROTIME\", \"PTT\", \"INR\", \"FIBRINOGEN\"      REVIEW OF RADIOLOGY   I have reviewed the following:  Radiology Studies           MRI L-spine 5/28/24 @[facility]         ASSESSMENT & PLAN  Rebekah Murillo is a 68 y.o. old female with PMH COPD, former smoker who presents for lumbar " TPI    1) Myofascial pain  -Spastic BL paraspinal m TTP with palpable TP  -TPI today  -F/U 2-4 w        Discussed procedure risks/benefits in detail with patient. Pt meets medical necessity for procedure due to failure of conservative measures. Reviewed procedural risks including bleeding, infection, nerve damage, paralysis. Also reviewed mitigating factors such as screening for infection/blood thinner use, sterile precautions, and image-guidance when applicable. All questions answered. Pt/guardian expressed understanding and choose to proceed           Glenn Aleman MD  Anesthesiologist & Interventional Pain Physician   [facility] [place]  O: [phone]  F: [phone]  7:48 AM  08/09/24

## 2024-08-09 NOTE — PROGRESS NOTES
PROCEDURE - TRIGGER POINT INJECTION   DIAGNOSIS:  Myofascial Trigger Point (designate muscles)     SIDE: Bilateral    1.  lumbar multifidus  2.  iliocostalis  3.  longissimus    Position: prone  Sterile Prep:  Alcohol   Needle(s):  25G X 1 1/4   Meds: 6 cc 1% lidocaine    Muscle(s) Injected   1.  lumbar multifidus  2.  iliocostalis  3.  longissimus    Patient tolerated the procedure well.   DISCHARGE SUMMARY: Complications: None; Condition on Discharge:  Stable and unchanged from admission; Disposition/Discharge:  Home     Post-op or Final Diagnosis:  Myofascial pain      Glenn Aleman MD  Anesthesiologist & Interventional Pain Physician   [facility] [place]  O: [phone]  F: [phone]  7:51 AM  08/09/24

## 2024-08-09 NOTE — H&P (VIEW-ONLY)
PAIN MANAGEMENT FOLLOW-UP OFFICE NOTE    Date of Service: 24    SUBJECTIVE    CHIEF COMPLAINT: LBP    HISTORY OF PRESENT ILLNESS    Rebekah Murillo is a 68 y.o. female with PMH COPD, former smoker who presents for lumbar TPI    Pt denies new-onset numbness, weakness, bowel/bladder incontinence.  Pt denies recent infection, allergy to Latex/iodine/contrast. Patient is currently taking the following blood thinner(s): N/A    Procedure log:  -L1 kypho 24    REVIEW OF SYSTEMS  Review of Systems   Constitutional: Negative.    HENT: Negative.     Eyes: Negative.    Respiratory: Negative.     Cardiovascular: Negative.    Gastrointestinal: Negative.    Endocrine: Negative.    Musculoskeletal:  Positive for back pain and gait problem.   Skin: Negative.    Hematological: Negative.    Psychiatric/Behavioral: Negative.         PAST MEDICAL HISTORY  Past Medical History:   Diagnosis Date    Acute bacterial conjunctivitis of both eyes 2023    Acute non-recurrent maxillary sinusitis 2023    Acute pain of right knee 2023    Arthritis unsure    Bronchitis, acute 2023    Chronic back pain     Chronic bronchitis (Multi) 25 years ago    1 x year    COPD (chronic obstructive pulmonary disease) (Multi)     Depression 30 years ago    medicated    Ex-smoker     quit 2023    Fractures (x2), ,     HL (hearing loss) 20 years ago    Osteoporosis     Urticaria, acute 2023    Vision loss 30 years ago    glasses     Past Surgical History:   Procedure Laterality Date    ELBOW SURGERY Left     EYE SURGERY Left     FINGER SURGERY Left     index    TUBAL LIGATION Bilateral      Family History   Problem Relation Name Age of Onset    Arthritis Mother Samantha Musa     Hearing loss Mother Samantha Musa     Heart disease Mother Samantha Musa     Vision loss Mother Samantha Musa     Alcohol abuse Father Gurvinder Musa -       Cancer Father Gurvinder Musa -       Cancer Maternal Grandfather Nolan  "Valero - Dec.     COPD Maternal Grandfather Nolan Valero - Dec.     Vision loss Maternal Grandfather Nolan Valero - Dec.     Hearing loss Maternal Grandmother Josselin Bueonshaw - Dec     Vision loss Maternal Grandmother Josselin Buenoshaw - Dec     Alcohol abuse Paternal Grandmother Rebekah Musa -         CURRENT MEDICATIONS  Current Outpatient Medications   Medication Sig Dispense Refill    albuterol (Ventolin HFA) 90 mcg/actuation inhaler INHALE 1 TO 2 PUFFS EVERY 4 TO 6 HOURS AS NEEDED. 18 g 2    ascorbic acid (Vitamin C) 1,000 mg tablet [facility] Vitamin C 1000 MG Oral Tablet   Refills: 0       Active      budesonide-formoteroL (Symbicort) 160-4.5 mcg/actuation inhaler Inhale 2 puffs 2 times a day. Rinse mouth with water after use to reduce aftertaste and incidence of candidiasis. Do not swallow.      buPROPion XL (Wellbutrin XL) 150 mg 24 hr tablet Take 2 tablets (300 mg) by mouth once daily in the morning. Take before meals.      calcium carbonate (CALCIUM 600 ORAL) Take 1,200 mg by mouth 2 times a day.      cholecalciferol (Vitamin D-3) 125 MCG (5000 UT) capsule Take 1,000 Units by mouth 2 times a day.      citalopram (CeleXA) 40 mg tablet Take 1 tablet (40 mg) by mouth once daily.      multivitamin tablet Take 1 tablet by mouth once daily.      pen needle, diabetic (BD Ultra-Fine Short Pen Needle) 31 gauge x 5/16\" needle Use 1 pen needle as directed once daily with Foreto injection. 30 each 11    teriparatide (Forteo) injection Inject 0.08 mL (20 mcg) under the skin once daily. Discard pen 28 days after first use. 1 each 10    traZODone (Desyrel) 100 mg tablet 1-2 tablets per night       No current facility-administered medications for this visit.       ALLERGIES AND DRUG REACTIONS  Allergies   Allergen Reactions    Adhesive Tape-Silicones Rash          OBJECTIVE  Visit Vitals  /62   Pulse 66   Resp 18   Ht 1.524 m (5')   Wt 59 kg (130 lb)   BMI 25.39 kg/m²   OB Status Postmenopausal   Smoking " "Status Former   BSA 1.58 m²       Last Recorded Pain Score (if available):                Physical Exam  Vitals and nursing note reviewed.       General: Sitting in chair, NAD  Head: NCAT  Eyes: Sclera/conjunctiva clear, EOMI, PERRL  Nose/mouth: MMM  CV: Good distal pulses  Lungs: Good/equal chest excursion  Abdomen: Soft, ND  Ext: No cyanosis/edema  MSK: L-spine alignment: thoracic kyphosis, BL paraspinal m TTP with palpable TP    Neuro: AAOx3, CN grossly nl  Psych: affect nl  Skin: no rash/lesions      REVIEW OF LABORATORY DATA  I have reviewed the following lab results:  WBC   Date Value Ref Range Status   06/09/2022 11.4 (H) 4.4 - 11.3 x10E9/L Final     RBC   Date Value Ref Range Status   06/09/2022 4.63 4.00 - 5.20 x10E12/L Final     Hemoglobin   Date Value Ref Range Status   06/09/2022 15.2 12.0 - 16.0 g/dL Final     Hematocrit   Date Value Ref Range Status   06/09/2022 45.3 36.0 - 46.0 % Final     MCV   Date Value Ref Range Status   06/09/2022 98 80 - 100 fL Final     MCHC   Date Value Ref Range Status   06/09/2022 33.6 32.0 - 36.0 g/dL Final     RDW   Date Value Ref Range Status   06/09/2022 12.5 11.5 - 14.5 % Final     Platelets   Date Value Ref Range Status   06/09/2022 203 150 - 450 x10E9/L Final     Sodium   Date Value Ref Range Status   06/09/2022 138 136 - 145 mmol/L Final     Potassium   Date Value Ref Range Status   06/09/2022 4.1 3.5 - 5.3 mmol/L Final     Bicarbonate   Date Value Ref Range Status   06/09/2022 29 21 - 32 mmol/L Final     Urea Nitrogen   Date Value Ref Range Status   06/09/2022 11 6 - 23 mg/dL Final     Calcium   Date Value Ref Range Status   06/09/2022 9.4 8.6 - 10.3 mg/dL Final     No results found for: \"PROTIME\", \"PTT\", \"INR\", \"FIBRINOGEN\"      REVIEW OF RADIOLOGY   I have reviewed the following:  Radiology Studies           MRI L-spine 5/28/24 @[facility]         ASSESSMENT & PLAN  Rebekah Murillo is a 68 y.o. old female with PMH COPD, former smoker who presents for lumbar " TPI    1) Myofascial pain  -Spastic BL paraspinal m TTP with palpable TP  -TPI today  -F/U 2-4 w        Discussed procedure risks/benefits in detail with patient. Pt meets medical necessity for procedure due to failure of conservative measures. Reviewed procedural risks including bleeding, infection, nerve damage, paralysis. Also reviewed mitigating factors such as screening for infection/blood thinner use, sterile precautions, and image-guidance when applicable. All questions answered. Pt/guardian expressed understanding and choose to proceed           Glenn Aleman MD  Anesthesiologist & Interventional Pain Physician   [facility] [place]  O: [phone]  F: [phone]  7:48 AM  08/09/24

## 2024-08-12 ENCOUNTER — PHARMACY VISIT (OUTPATIENT)
Dept: PHARMACY | Facility: CLINIC | Age: 69
End: 2024-08-12
Payer: MEDICARE

## 2024-08-15 ENCOUNTER — HOSPITAL ENCOUNTER (OUTPATIENT)
Dept: GASTROENTEROLOGY | Facility: HOSPITAL | Age: 69
Discharge: HOME | End: 2024-08-15
Payer: MEDICARE

## 2024-08-15 VITALS
OXYGEN SATURATION: 96 % | DIASTOLIC BLOOD PRESSURE: 69 MMHG | BODY MASS INDEX: 25.52 KG/M2 | RESPIRATION RATE: 16 BRPM | HEIGHT: 60 IN | WEIGHT: 130 LBS | SYSTOLIC BLOOD PRESSURE: 117 MMHG | TEMPERATURE: 97.3 F | HEART RATE: 64 BPM

## 2024-08-15 DIAGNOSIS — M47.816 LUMBAR SPONDYLOSIS: ICD-10-CM

## 2024-08-15 PROCEDURE — 64493 INJ PARAVERT F JNT L/S 1 LEV: CPT | Performed by: ANESTHESIOLOGY

## 2024-08-15 PROCEDURE — 64494 INJ PARAVERT F JNT L/S 2 LEV: CPT | Performed by: ANESTHESIOLOGY

## 2024-08-15 PROCEDURE — 64493 INJ PARAVERT F JNT L/S 1 LEV: CPT | Mod: 50 | Performed by: ANESTHESIOLOGY

## 2024-08-15 PROCEDURE — 64494 INJ PARAVERT F JNT L/S 2 LEV: CPT | Mod: 50 | Performed by: ANESTHESIOLOGY

## 2024-08-15 PROCEDURE — 2500000004 HC RX 250 GENERAL PHARMACY W/ HCPCS (ALT 636 FOR OP/ED): Performed by: ANESTHESIOLOGY

## 2024-08-15 RX ORDER — BUPIVACAINE HYDROCHLORIDE 5 MG/ML
INJECTION, SOLUTION EPIDURAL; INTRACAUDAL AS NEEDED
Status: COMPLETED | OUTPATIENT
Start: 2024-08-15 | End: 2024-08-15

## 2024-08-15 ASSESSMENT — PAIN SCALES - GENERAL
PAINLEVEL_OUTOF10: 8
PAINLEVEL_OUTOF10: 10 - WORST POSSIBLE PAIN

## 2024-08-15 ASSESSMENT — PAIN DESCRIPTION - DESCRIPTORS: DESCRIPTORS: ACHING

## 2024-08-15 ASSESSMENT — PAIN - FUNCTIONAL ASSESSMENT
PAIN_FUNCTIONAL_ASSESSMENT: 0-10
PAIN_FUNCTIONAL_ASSESSMENT: 0-10

## 2024-08-15 NOTE — DISCHARGE INSTRUCTIONS
DISCHARGE INSTRUCTIONS FOR INJECTIONS     You underwent lumbar medial branch nerve blocks today    Aftermost injections, it is recommended that you relax and limit your activity for the remainder of the day unless you have been told otherwise by your pain physician.  You should not drive a car, operate machinery, or make important legal decisions unless otherwise directed by your pain physician.  You may resume your normal activity, including exercise, tomorrow.      Keep a written pain diary of how much pain relief you experienced following the injection procedure and the length of time of pain relief you experienced pain relief. Following diagnostic injections like medial branch nerve blocks, sacroiliac joint blocks, stellate ganglion injections and other blocks, it is very important you record the specific amount of pain relief you experienced immediately after the injectionand how long it lasted. Your doctor will ask you for this information at your follow up visit.     For all injections, please keep the injection site dry and inspect the site for a couple of days. You may remove the Band-Aid the day of the injection at any time.     Some discomfort, bruising or slight swelling may occur at the injection site. This is not abnormal if it occurs.  If needed you may:    -Take over the counter medication such as Tylenol or Motrin.   -Apply an ice pack for 30 minutes, 2 to 3 times a day for the first 24 hours.     You may shower today; no soaking baths, hot tubs, whirlpools or swimming pools for two days.      If you are given steroids in your injection, it may take 3-5 days for the steroid medication to take effect. You may notice a worsening of your symptoms for 1-2 days after the injection. This is not abnormal.  You may use acetaminophen, ibuprofen, or prescription medication that your doctor may have prescribed for you if you need to do so.     A few common side effects of steroids include facial flushing,  sweating, restlessness, irritability,difficulty sleeping, increase in blood sugar, and increased blood pressure. If you have diabetes, please monitor your blood sugar at least once a day for at least 5 days. If you have poorly controlled high blood pressure, monitoryour blood pressure for at least 2 days and contact your primary care physician if these numbers are unusually high for you.      If you take aspirin or non-steroidal anti-inflammatory drugs (examples are Motrin, Advil, ibuprofen, Naprosyn, Voltaren, Relafen, etc.) you may restart these this evening, but stop taking it 3 days before your next appointment, unless instructed otherwiseby your physician.      You do not need to discontinue non-aspirin-containing pain medications prior to an injection (examples: Celebrex, tramadol, hydrocodone and acetaminophen).      If you take a blood thinning medication (Coumadin, Lovenox, Fragmin,Ticlid, Plavix, Pradaxa, etc.), please discuss this with your primary care physician/cardiologist and your pain physician. These medications MUST be discontinued before you can have an injection safely, without the risk of uncontrolled bleeding. If these medications are not discontinued for an appropriate period of time, you will not be able to receivean injection.      If you are taking Coumadin, please have your INR checked the morning of your procedure and bringthe result to your appointment unless otherwise instructed. If your INR is over 1.2, your injection may need to be rescheduled to avoid uncontrolled bleeding from the needle placement.     Call [facility] at [phone] between 8am-4pm Monday - Friday if you are experiencing the following:    If you received an epidural or spinal injection:    -Headache that doesnot go away with medicine, is worse when sitting or standing up, and is greatly relieved upon lying down.   -Severe pain worse than or different than your baseline pain.   -Chills or fever  (101º F or greater).   -Drainage or signs of infection at the injection site     Go directly to the Emergency Department if you are experiencing the following and received an epidural or spinal injection:   -Abrupt weakness or progressive weakness in your legs that starts after you leave the clinic.   -Abrupt severe or worsening numbness in your legs.   -Inability to urinate after the injection or loss of bowel or bladder control without the urge to defecate or urinate.     If you have a clinical question that cannot wait until your next appointment, please call [phone] between 8am-4pm Monday - Friday or send a [employer] message. We do our best to return all non-emergency messages within 24 hours, Monday - Friday. A nurse or physician will return your message.      If you need to cancel an appointment, please call the scheduling staff at [phone] during normal business hours or leave a message at least 24 hours in advance.     If you are going to be sedated for your next procedure, you MUST have responsible adult who can legally drive accompany you home. You cannot eat or drink for eight hours prior to the planned procedure if you are going to receive sedation. You may take your non-blood thinning medications with a small sip of water.

## 2024-08-15 NOTE — INTERVAL H&P NOTE
H&P reviewed. The patient was examined and there are no changes to the H&P. Rebekah Murillo is a 68 y.o. female with PMH COPD, former smoker who presents for prognostic medial branch nerve blocks of BL L4-5, L5-S1 facets with local anesthetic only under fluoroscopic guidance to assess candidacy for RFA. Patient's pain stable and persistent from last visit. No personal/family hx issues with anesthesia. Denies allergies to Latex, steroids, local anesthetics, or iodine/contrast. Denies being on blood thinners. Not diabetic.  Denies fever, chills, NS, CP, SOB, cough, N/V.    Discussed procedure risks/benefits in detail with patient. Pt meets medical necessity for procedure due to failure of conservative measures. Reviewed procedural risks including bleeding, infection, nerve damage, paralysis. Also reviewed mitigating factors such as screening for infection/blood thinner use, sterile precautions, and image-guidance when applicable. All questions answered. Pt/guardian expressed understanding and choose to proceed      Glenn Aleman MD  Anesthesiologist & Interventional Pain Physician   [facility]  O: [phone]  F: [phone]  9:49 AM  08/15/24

## 2024-08-15 NOTE — PERIOPERATIVE NURSING NOTE
Received patient from procedure room.   Patient alert and awake.   Paper tape and gauze dry and intact to bilateral low back.  Discharge instructions reviewed with patient and spouse.  Patient ambulates to bathroom without difficulty, pt denies weakness of lower extremities

## 2024-08-16 ENCOUNTER — TELEPHONE (OUTPATIENT)
Dept: PAIN MEDICINE | Facility: CLINIC | Age: 69
End: 2024-08-16
Payer: MEDICARE

## 2024-08-16 DIAGNOSIS — M54.50 ACUTE MIDLINE LOW BACK PAIN WITHOUT SCIATICA: ICD-10-CM

## 2024-08-16 RX ORDER — HYDROCODONE BITARTRATE AND ACETAMINOPHEN 7.5; 325 MG/1; MG/1
1 TABLET ORAL 3 TIMES DAILY PRN
Qty: 39 TABLET | Refills: 0 | Status: SHIPPED | OUTPATIENT
Start: 2024-08-16 | End: 2024-08-29

## 2024-08-16 NOTE — TELEPHONE ENCOUNTER
Phone call from the patient stating she had a facet injection done yesterday and pain medication was supposed to be sent in. She states it has not been received from pharmacy. Please advise.

## 2024-08-29 ENCOUNTER — OFFICE VISIT (OUTPATIENT)
Dept: PAIN MEDICINE | Facility: CLINIC | Age: 69
End: 2024-08-29
Payer: MEDICARE

## 2024-08-29 VITALS
DIASTOLIC BLOOD PRESSURE: 74 MMHG | SYSTOLIC BLOOD PRESSURE: 116 MMHG | OXYGEN SATURATION: 95 % | BODY MASS INDEX: 25.52 KG/M2 | HEART RATE: 77 BPM | HEIGHT: 60 IN | WEIGHT: 130 LBS | RESPIRATION RATE: 18 BRPM

## 2024-08-29 DIAGNOSIS — M47.816 LUMBAR SPONDYLOSIS: ICD-10-CM

## 2024-08-29 DIAGNOSIS — M79.18 DIFFUSE MYOFASCIAL PAIN SYNDROME: Primary | ICD-10-CM

## 2024-08-29 DIAGNOSIS — M54.50 ACUTE MIDLINE LOW BACK PAIN WITHOUT SCIATICA: ICD-10-CM

## 2024-08-29 PROCEDURE — 3008F BODY MASS INDEX DOCD: CPT | Performed by: PHYSICIAN ASSISTANT

## 2024-08-29 PROCEDURE — 99214 OFFICE O/P EST MOD 30 MIN: CPT | Performed by: PHYSICIAN ASSISTANT

## 2024-08-29 PROCEDURE — 1036F TOBACCO NON-USER: CPT | Performed by: PHYSICIAN ASSISTANT

## 2024-08-29 PROCEDURE — 1125F AMNT PAIN NOTED PAIN PRSNT: CPT | Performed by: PHYSICIAN ASSISTANT

## 2024-08-29 PROCEDURE — 1159F MED LIST DOCD IN RCRD: CPT | Performed by: PHYSICIAN ASSISTANT

## 2024-08-29 RX ORDER — OXYCODONE AND ACETAMINOPHEN 7.5; 325 MG/1; MG/1
1 TABLET ORAL EVERY 8 HOURS PRN
Qty: 90 TABLET | Refills: 0 | Status: SHIPPED | OUTPATIENT
Start: 2024-08-29 | End: 2024-09-28

## 2024-08-29 RX ORDER — DICLOFENAC SODIUM 10 MG/G
4 GEL TOPICAL 4 TIMES DAILY
Qty: 480 G | Refills: 0 | Status: SHIPPED | OUTPATIENT
Start: 2024-08-29 | End: 2024-09-28

## 2024-08-29 ASSESSMENT — LIFESTYLE VARIABLES
HOW OFTEN DO YOU HAVE A DRINK CONTAINING ALCOHOL: NEVER
HOW MANY STANDARD DRINKS CONTAINING ALCOHOL DO YOU HAVE ON A TYPICAL DAY: PATIENT DOES NOT DRINK
HOW OFTEN DO YOU HAVE SIX OR MORE DRINKS ON ONE OCCASION: NEVER
AUDIT-C TOTAL SCORE: 0
SKIP TO QUESTIONS 9-10: 1

## 2024-08-29 ASSESSMENT — ENCOUNTER SYMPTOMS
CARDIOVASCULAR NEGATIVE: 1
EYES NEGATIVE: 1
BACK PAIN: 1
ENDOCRINE NEGATIVE: 1
CONSTITUTIONAL NEGATIVE: 1
RESPIRATORY NEGATIVE: 1
GASTROINTESTINAL NEGATIVE: 1
HEMATOLOGIC/LYMPHATIC NEGATIVE: 1
PSYCHIATRIC NEGATIVE: 1

## 2024-08-29 ASSESSMENT — PAIN SCALES - GENERAL
PAINLEVEL_OUTOF10: 6
PAINLEVEL: 6

## 2024-08-29 ASSESSMENT — PATIENT HEALTH QUESTIONNAIRE - PHQ9
SUM OF ALL RESPONSES TO PHQ9 QUESTIONS 1 & 2: 0
2. FEELING DOWN, DEPRESSED OR HOPELESS: NOT AT ALL
1. LITTLE INTEREST OR PLEASURE IN DOING THINGS: NOT AT ALL

## 2024-08-29 ASSESSMENT — PAIN - FUNCTIONAL ASSESSMENT: PAIN_FUNCTIONAL_ASSESSMENT: 0-10

## 2024-08-29 NOTE — PROGRESS NOTES
Subjective   Patient ID: Rebekah Murillo is a 68 y.o. female who presents for Pain.  Patient is a 68-year-old female here today for follow-up after lumbar medial branch blocks.  Patient states she had no pain relief during the diagnostic period with certain types of positioning mostly in seated or laying she can get her pain completely resolved.  She did notes that with sit to stands she has been having lower lumbar spasms and pain.  She does do note that the hydrocodone does not seem to provide much but about 10% relief.  Patient takes an Aleve twice daily in conjunction with hydrocodone and not seem to have drastic relief.  She did notes that the trigger point helped in the kyphoplasty region and also with the kyphoplasty has resolved this upper mid back pain.  Patient denotes that she is not had any specific injuries or traumas and denies any lower extremity symptoms or loss of bowel or bladder control        Review of Systems   Constitutional: Negative.    HENT: Negative.     Eyes: Negative.    Respiratory: Negative.     Cardiovascular: Negative.    Gastrointestinal: Negative.    Endocrine: Negative.    Musculoskeletal:  Positive for back pain and gait problem.   Skin: Negative.    Hematological: Negative.    Psychiatric/Behavioral: Negative.         Objective   Physical Exam  Musculoskeletal:      Lumbar back: Spasms and tenderness present. Decreased range of motion. Negative right straight leg raise test and negative left straight leg raise test.        Back:       Comments: Spasms and tender point with jump responses.          Assessment/Plan   Problem List Items Addressed This Visit             ICD-10-CM    Diffuse myofascial pain syndrome - Primary M79.18    Relevant Medications    oxyCODONE-acetaminophen (Percocet) 7.5-325 mg tablet    Other Relevant Orders    Referral to Physical Therapy     Other Visit Diagnoses         Codes    Acute midline low back pain without sciatica     M54.50    Relevant  Medications    oxyCODONE-acetaminophen (Percocet) 7.5-325 mg tablet    Other Relevant Orders    Referral to Physical Therapy    Lumbar spondylosis     M47.816    Relevant Medications    diclofenac sodium (Voltaren) 1 % gel          Unsuccessful diagnostic lumbar medial branch blocks.  With the tenderness in the lower lumbar region I think this is more muscular in potentially trigger point injections could provide patient with benefit.  Will have patient trial physical therapy and topical Voltaren gel to this region.  Will have discontinued the Aleve during the time of trial for the lumbar region.  If patient feels that the Aleve is better in benefit then she can resume that and discontinue the diclofenac.  Without radicular component without changes or injuries and traumas I do not think that new imaging would be prudent.  Organ to go ahead and switch patient to a 7.5 mg Percocet to see if this is more effective.  Will have patient follow-up in 30 days no suspicion of abuse diversion [place]AR[place] is been reviewed he did discuss the pain management agreement we verbalized and talked about some of the main points today patient signed the consent form and in agreement with the plan       Erick Donaldson PA-C 08/29/24 11:05 AM

## 2024-08-29 NOTE — PROGRESS NOTES
MEDICATION NAME: Hydrocodone   STRENGTH: 7.5-325  LAST FILL DATE: 24  DATE LAST TAKEN: 24  QUANTITY FILLED: 39  QUANTITY REMAININ  COUNT COMPLETED BY: JESSIKA PRICE and SHERYL MCLEAN      UDS LAST COMPLETED:   CONTROLLED SUBSTANCES AGREEMENT LAST SIGNED:   ORT LAST COMPLETED:  Modified Osw[employer] disability form filled out annually.

## 2024-08-30 ENCOUNTER — APPOINTMENT (OUTPATIENT)
Dept: PAIN MEDICINE | Facility: CLINIC | Age: 69
End: 2024-08-30
Payer: MEDICARE

## 2024-09-05 PROCEDURE — RXMED WILLOW AMBULATORY MEDICATION CHARGE

## 2024-09-10 ENCOUNTER — SPECIALTY PHARMACY (OUTPATIENT)
Dept: PHARMACY | Facility: CLINIC | Age: 69
End: 2024-09-10

## 2024-09-11 ENCOUNTER — PHARMACY VISIT (OUTPATIENT)
Dept: PHARMACY | Facility: CLINIC | Age: 69
End: 2024-09-11
Payer: MEDICARE

## 2024-09-18 ENCOUNTER — APPOINTMENT (OUTPATIENT)
Dept: PAIN MEDICINE | Facility: CLINIC | Age: 69
End: 2024-09-18
Payer: MEDICARE

## 2024-10-09 ENCOUNTER — SPECIALTY PHARMACY (OUTPATIENT)
Dept: PHARMACY | Facility: CLINIC | Age: 69
End: 2024-10-09

## 2024-10-09 PROCEDURE — RXMED WILLOW AMBULATORY MEDICATION CHARGE

## 2024-10-10 ENCOUNTER — PHARMACY VISIT (OUTPATIENT)
Dept: PHARMACY | Facility: CLINIC | Age: 69
End: 2024-10-10
Payer: MEDICARE

## 2024-10-11 ENCOUNTER — APPOINTMENT (OUTPATIENT)
Dept: ORTHOPEDIC SURGERY | Facility: CLINIC | Age: 69
End: 2024-10-11
Payer: MEDICARE

## 2024-10-11 VITALS — BODY MASS INDEX: 25.52 KG/M2 | HEIGHT: 60 IN | WEIGHT: 130 LBS

## 2024-10-11 DIAGNOSIS — M80.88XA PATHOLOGICAL FRACTURE OF VERTEBRA DUE TO SECONDARY OSTEOPOROSIS (MULTI): ICD-10-CM

## 2024-10-11 PROCEDURE — 99203 OFFICE O/P NEW LOW 30 MIN: CPT | Performed by: ORTHOPAEDIC SURGERY

## 2024-10-11 PROCEDURE — 1159F MED LIST DOCD IN RCRD: CPT | Performed by: ORTHOPAEDIC SURGERY

## 2024-10-11 PROCEDURE — 3008F BODY MASS INDEX DOCD: CPT | Performed by: ORTHOPAEDIC SURGERY

## 2024-10-11 NOTE — LETTER
October 15, 2024     Yaniv Whitehead MD  [address]  [address]  [facility] [address]    Patient: Rebekah Murillo   YOB: 1955   Date of Visit: 10/11/2024       Dear Dr. Yaniv Whitehead MD:    Thank you for referring Rebekah Murillo to me for evaluation. Below are my notes for this consultation.  If you have questions, please do not hesitate to call me. I look forward to following your patient along with you.       Sincerely,     Silvano Rueda MD      CC: No Recipients  ______________________________________________________________________________________    HPI:Rebekah Murillo is a 68-year-old woman, who comes in with complaints of low back pain.  She notices a clicking sensation in her back with activity.  She denies leg pain.  She has no claudication symptoms.  She has had pain management and numerous injections including radiofrequency treatments.  Her past medical history significant for prior L1 vertebroplasty which she found helpful.  Nonetheless, her back pain has gotten worse over the course the last couple of months.  She has not had an updated MRI.      ROS:  Reviewed on EMR and patient intake sheet.    PMH/SH:  Reviewed on EMR and patient intake sheet.    Exam:  Physical Exam    Constitutional: Well appearing; no acute distress  Eyes: pupils are equal and round  Psych: normal affect  Respiratory: non-labored breathing  Cardiovascular: regular rate and rhythm  GI: non-distended abdomen  Musculoskeletal: no pain with range of motion of the hips bilaterally  Neurologic: [4]/5 strength in the lower extremities bilaterally]; [negative] straight leg raise    Radiology:     Imaging demonstrates prior L1 vertebroplasty    Diagnosis:    Intractable low back pain; pathologic compression fracture secondary to osteoporosis    Assessment and Plan:   68-year-old woman with intractable low back pain despite nonoperative management including recent pain management.  At this point a follow-up MRI would be  appropriate to see if she has another pathologic compression fracture.  I will see her back after the MRI.    The patient was in agreement with the plan. At the end of the visit today, the patient felt that all questions had been answered satisfactorily.  The patient was pleased with the visit and very appreciative for the care rendered.     Thank you very much for the kind referral.  It is a privilege, and a pleasure, to partner with you in the care of your patients.  I would be delighted to assist you with any further consultations as needed.          Silvano Rueda MD    Chief of Spine Surgery, [facility]  Director of Spine Service, [facility]  , Department of Orthopaedics  [facility] [employer]  [address] [place] [address]  P: [phone]  [facility]    This note was dictated with voice recognition software.  It has not been proofread for grammatical errors, typographical mistakes or other semantic inconsistencies.

## 2024-10-13 ENCOUNTER — HOSPITAL ENCOUNTER (OUTPATIENT)
Dept: RADIOLOGY | Facility: HOSPITAL | Age: 69
Discharge: HOME | End: 2024-10-13
Payer: MEDICARE

## 2024-10-13 DIAGNOSIS — M54.16 LUMBAR RADICULOPATHY: ICD-10-CM

## 2024-10-13 PROCEDURE — 72148 MRI LUMBAR SPINE W/O DYE: CPT | Performed by: RADIOLOGY

## 2024-10-13 PROCEDURE — 72148 MRI LUMBAR SPINE W/O DYE: CPT

## 2024-10-15 ENCOUNTER — APPOINTMENT (OUTPATIENT)
Dept: PHYSICAL THERAPY | Facility: CLINIC | Age: 69
End: 2024-10-15
Payer: COMMERCIAL

## 2024-10-15 NOTE — PROGRESS NOTES
HPI:Rebekah Murillo is a 68-year-old woman, who comes in with complaints of low back pain.  She notices a clicking sensation in her back with activity.  She denies leg pain.  She has no claudication symptoms.  She has had pain management and numerous injections including radiofrequency treatments.  Her past medical history significant for prior L1 vertebroplasty which she found helpful.  Nonetheless, her back pain has gotten worse over the course the last couple of months.  She has not had an updated MRI.      ROS:  Reviewed on EMR and patient intake sheet.    PMH/SH:  Reviewed on EMR and patient intake sheet.    Exam:  Physical Exam    Constitutional: Well appearing; no acute distress  Eyes: pupils are equal and round  Psych: normal affect  Respiratory: non-labored breathing  Cardiovascular: regular rate and rhythm  GI: non-distended abdomen  Musculoskeletal: no pain with range of motion of the hips bilaterally  Neurologic: [4]/5 strength in the lower extremities bilaterally]; [negative] straight leg raise    Radiology:     Imaging demonstrates prior L1 vertebroplasty    Diagnosis:    Intractable low back pain; pathologic compression fracture secondary to osteoporosis    Assessment and Plan:   68-year-old woman with intractable low back pain despite nonoperative management including recent pain management.  At this point a follow-up MRI would be appropriate to see if she has another pathologic compression fracture.  I will see her back after the MRI.    The patient was in agreement with the plan. At the end of the visit today, the patient felt that all questions had been answered satisfactorily.  The patient was pleased with the visit and very appreciative for the care rendered.     Thank you very much for the kind referral.  It is a privilege, and a pleasure, to partner with you in the care of your patients.  I would be delighted to assist you with any further consultations as needed.          Silvano Rueda,  MD    Chief of Spine Surgery, [facility]  Director of Spine Service, [facility]  , Department of Orthopaedics  [facility] [employer]  [address]  [place] [id number]  P: [phone]  [facility]    This note was dictated with voice recognition software.  It has not been proofread for grammatical errors, typographical mistakes or other semantic inconsistencies.

## 2024-10-17 ENCOUNTER — APPOINTMENT (OUTPATIENT)
Dept: PHYSICAL THERAPY | Facility: CLINIC | Age: 69
End: 2024-10-17
Payer: COMMERCIAL

## 2024-10-21 ENCOUNTER — APPOINTMENT (OUTPATIENT)
Dept: PHYSICAL THERAPY | Facility: CLINIC | Age: 69
End: 2024-10-21
Payer: COMMERCIAL

## 2024-10-25 ENCOUNTER — APPOINTMENT (OUTPATIENT)
Dept: ORTHOPEDIC SURGERY | Facility: CLINIC | Age: 69
End: 2024-10-25
Payer: MEDICARE

## 2024-10-25 ENCOUNTER — APPOINTMENT (OUTPATIENT)
Dept: PHYSICAL THERAPY | Facility: CLINIC | Age: 69
End: 2024-10-25
Payer: COMMERCIAL

## 2024-10-25 VITALS — WEIGHT: 120 LBS | BODY MASS INDEX: 23.56 KG/M2 | HEIGHT: 60 IN

## 2024-10-25 DIAGNOSIS — M80.88XA PATHOLOGICAL FRACTURE OF VERTEBRA DUE TO SECONDARY OSTEOPOROSIS (MULTI): Primary | ICD-10-CM

## 2024-10-25 PROCEDURE — 3008F BODY MASS INDEX DOCD: CPT | Performed by: ORTHOPAEDIC SURGERY

## 2024-10-25 PROCEDURE — 99214 OFFICE O/P EST MOD 30 MIN: CPT | Performed by: ORTHOPAEDIC SURGERY

## 2024-10-25 PROCEDURE — 1159F MED LIST DOCD IN RCRD: CPT | Performed by: ORTHOPAEDIC SURGERY

## 2024-10-25 RX ORDER — CELECOXIB 200 MG/1
200 CAPSULE ORAL 2 TIMES DAILY
Qty: 60 CAPSULE | Refills: 0 | Status: SHIPPED | OUTPATIENT
Start: 2024-10-25 | End: 2024-11-24

## 2024-10-25 NOTE — LETTER
October 27, 2024     Yaniv Whitehead MD  [address]  [address]  [facility] [address]    Patient: Rebekah Murillo   YOB: 1955   Date of Visit: 10/25/2024       Dear Dr. Yaniv Whitehead MD:    Thank you for referring Rebekah Murillo to me for evaluation. Below are my notes for this consultation.  If you have questions, please do not hesitate to call me. I look forward to following your patient along with you.       Sincerely,     Silvano Rueda MD      CC: No Recipients  ______________________________________________________________________________________    HPI:Rebekah Murillo state-year-old man who comes in for a follow-up.  She comes to review her MRI.  She continues to have back pain.  She denies any claudication or radicular symptoms.      ROS:  Reviewed on EMR and patient intake sheet.    PMH/SH:  Reviewed on EMR and patient intake sheet.    Exam:  Physical Exam    Constitutional: Well appearing; no acute distress  Eyes: pupils are equal and round  Psych: normal affect  Respiratory: non-labored breathing  Cardiovascular: regular rate and rhythm  GI: non-distended abdomen  Musculoskeletal: no pain with range of motion of the hips bilaterally  Neurologic: [4+]/5 strength in the lower extremities bilaterally]; [negative] straight leg raise    Radiology:     MRI demonstrates cement augmentation at L1.  Patient has new fractures at L2 and L4.  No significant stenosis.    Diagnosis:    L2, L4 compression fractures secondary to osteoporosis    Assessment and Plan:   68-year-old woman with multiple pathologic fractures secondary to osteoporosis.  We discussed the natural history of these fractures.  I have recommended nonoperative management with medication and bracing.  She was given a prescription for Celebrex.  If the symptoms are intolerable despite the aforementioned treatment, then cement augmentation would be the last resort.  However, given her underlying bone quality, the patient would certainly be  at risk for adjacent segment fractures that she is previously demonstrated.  She was in agreement with a nonoperative plan and will follow-up.    The patient was in agreement with the plan. At the end of the visit today, the patient felt that all questions had been answered satisfactorily.  The patient was pleased with the visit and very appreciative for the care rendered.     Thank you very much for the kind referral.  It is a privilege, and a pleasure, to partner with you in the care of your patients.  I would be delighted to assist you with any further consultations as needed.          Silvano Rueda MD    Chief of Spine Surgery, [facility]  Director of Spine Service, [facility]  , Department of Orthopaedics  [facility] [employer]  [address] [place] [address]  P: [phone]  [facility]    This note was dictated with voice recognition software.  It has not been proofread for grammatical errors, typographical mistakes or other semantic inconsistencies.

## 2024-10-27 NOTE — PROGRESS NOTES
HPI:Rebekah Murillo state-year-old man who comes in for a follow-up.  She comes to review her MRI.  She continues to have back pain.  She denies any claudication or radicular symptoms.      ROS:  Reviewed on EMR and patient intake sheet.    PMH/SH:  Reviewed on EMR and patient intake sheet.    Exam:  Physical Exam    Constitutional: Well appearing; no acute distress  Eyes: pupils are equal and round  Psych: normal affect  Respiratory: non-labored breathing  Cardiovascular: regular rate and rhythm  GI: non-distended abdomen  Musculoskeletal: no pain with range of motion of the hips bilaterally  Neurologic: [4+]/5 strength in the lower extremities bilaterally]; [negative] straight leg raise    Radiology:     MRI demonstrates cement augmentation at L1.  Patient has new fractures at L2 and L4.  No significant stenosis.    Diagnosis:    L2, L4 compression fractures secondary to osteoporosis    Assessment and Plan:   68-year-old woman with multiple pathologic fractures secondary to osteoporosis.  We discussed the natural history of these fractures.  I have recommended nonoperative management with medication and bracing.  She was given a prescription for Celebrex.  If the symptoms are intolerable despite the aforementioned treatment, then cement augmentation would be the last resort.  However, given her underlying bone quality, the patient would certainly be at risk for adjacent segment fractures that she is previously demonstrated.  She was in agreement with a nonoperative plan and will follow-up.    The patient was in agreement with the plan. At the end of the visit today, the patient felt that all questions had been answered satisfactorily.  The patient was pleased with the visit and very appreciative for the care rendered.     Thank you very much for the kind referral.  It is a privilege, and a pleasure, to partner with you in the care of your patients.  I would be delighted to assist you with any further consultations  as needed.          Silvano Rueda MD    Chief of Spine Surgery, [facility]  Director of Spine Service, [facility]  , Department of Orthopaedics  [facility] [employer]  [address] [place] [address]  P: [phone]  [facility]    This note was dictated with voice recognition software.  It has not been proofread for grammatical errors, typographical mistakes or other semantic inconsistencies.

## 2024-11-04 ENCOUNTER — SPECIALTY PHARMACY (OUTPATIENT)
Dept: PHARMACY | Facility: CLINIC | Age: 69
End: 2024-11-04

## 2024-11-06 ENCOUNTER — SPECIALTY PHARMACY (OUTPATIENT)
Dept: PHARMACY | Facility: CLINIC | Age: 69
End: 2024-11-06

## 2024-11-06 PROCEDURE — RXMED WILLOW AMBULATORY MEDICATION CHARGE

## 2024-11-07 ENCOUNTER — PHARMACY VISIT (OUTPATIENT)
Dept: PHARMACY | Facility: CLINIC | Age: 69
End: 2024-11-07
Payer: MEDICARE

## 2024-11-25 DIAGNOSIS — M80.88XA PATHOLOGICAL FRACTURE OF VERTEBRA DUE TO SECONDARY OSTEOPOROSIS (MULTI): ICD-10-CM

## 2024-11-25 RX ORDER — CELECOXIB 200 MG/1
200 CAPSULE ORAL 2 TIMES DAILY
Qty: 60 CAPSULE | Refills: 0 | Status: SHIPPED | OUTPATIENT
Start: 2024-11-25 | End: 2024-12-25

## 2024-11-27 RX ORDER — CELECOXIB 200 MG/1
200 CAPSULE ORAL 2 TIMES DAILY
Qty: 60 CAPSULE | Refills: 0 | OUTPATIENT
Start: 2024-11-27

## 2024-12-01 ENCOUNTER — SPECIALTY PHARMACY (OUTPATIENT)
Dept: PHARMACY | Facility: CLINIC | Age: 69
End: 2024-12-01

## 2024-12-01 PROCEDURE — RXMED WILLOW AMBULATORY MEDICATION CHARGE

## 2024-12-04 ENCOUNTER — PHARMACY VISIT (OUTPATIENT)
Dept: PHARMACY | Facility: CLINIC | Age: 69
End: 2024-12-04
Payer: MEDICARE

## 2024-12-10 ENCOUNTER — TELEPHONE (OUTPATIENT)
Dept: PRIMARY CARE | Facility: CLINIC | Age: 69
End: 2024-12-10
Payer: MEDICARE

## 2024-12-10 DIAGNOSIS — R91.8 LUNG NODULES: ICD-10-CM

## 2024-12-10 DIAGNOSIS — Z12.31 OTHER SCREENING MAMMOGRAM: ICD-10-CM

## 2024-12-10 DIAGNOSIS — Z78.0 POST-MENOPAUSAL: ICD-10-CM

## 2024-12-10 DIAGNOSIS — Z12.39 SCREENING BREAST EXAMINATION: ICD-10-CM

## 2024-12-10 NOTE — TELEPHONE ENCOUNTER
Patient is asking for orders for her mammogram, DEXA scan and CT Lung Cancer screening to be put in. Can the patient be contacted when in put so she can schedule

## 2024-12-27 ENCOUNTER — SPECIALTY PHARMACY (OUTPATIENT)
Dept: PHARMACY | Facility: CLINIC | Age: 69
End: 2024-12-27

## 2024-12-27 PROCEDURE — RXMED WILLOW AMBULATORY MEDICATION CHARGE

## 2024-12-30 ENCOUNTER — PHARMACY VISIT (OUTPATIENT)
Dept: PHARMACY | Facility: CLINIC | Age: 69
End: 2024-12-30
Payer: MEDICARE

## 2024-12-31 ENCOUNTER — HOSPITAL ENCOUNTER (OUTPATIENT)
Dept: RADIOLOGY | Facility: HOSPITAL | Age: 69
Discharge: HOME | End: 2024-12-31
Payer: MEDICARE

## 2024-12-31 VITALS — HEIGHT: 60 IN | BODY MASS INDEX: 23.75 KG/M2 | WEIGHT: 121 LBS

## 2024-12-31 DIAGNOSIS — Z12.39 SCREENING BREAST EXAMINATION: ICD-10-CM

## 2024-12-31 DIAGNOSIS — Z12.31 OTHER SCREENING MAMMOGRAM: ICD-10-CM

## 2024-12-31 DIAGNOSIS — R91.8 LUNG NODULES: ICD-10-CM

## 2024-12-31 DIAGNOSIS — Z78.0 POST-MENOPAUSAL: ICD-10-CM

## 2024-12-31 PROCEDURE — 77080 DXA BONE DENSITY AXIAL: CPT | Performed by: RADIOLOGY

## 2024-12-31 PROCEDURE — 71250 CT THORAX DX C-: CPT

## 2024-12-31 PROCEDURE — 77063 BREAST TOMOSYNTHESIS BI: CPT | Performed by: RADIOLOGY

## 2024-12-31 PROCEDURE — 77067 SCR MAMMO BI INCL CAD: CPT

## 2024-12-31 PROCEDURE — 77067 SCR MAMMO BI INCL CAD: CPT | Performed by: RADIOLOGY

## 2024-12-31 PROCEDURE — 77080 DXA BONE DENSITY AXIAL: CPT

## 2025-01-06 ENCOUNTER — TELEPHONE (OUTPATIENT)
Dept: PRIMARY CARE | Facility: CLINIC | Age: 70
End: 2025-01-06
Payer: MEDICARE

## 2025-01-06 DIAGNOSIS — R91.1 LUNG NODULE: ICD-10-CM

## 2025-01-06 NOTE — TELEPHONE ENCOUNTER
----- Message from Yaniv Whitehead sent at 1/5/2025 10:06 AM EST -----  Stable chest findings  Some suggestion of bronchiolitis which is scar tissue  Repeat chest ct no contrast one year for monitoring, pls order this, dx lung nodule

## 2025-01-09 PROBLEM — S22.069K: Status: ACTIVE | Noted: 2024-05-28

## 2025-01-09 PROBLEM — H02.413 MECHANICAL PTOSIS OF EYELID OF BOTH EYES: Status: ACTIVE | Noted: 2025-01-09

## 2025-01-09 PROBLEM — J44.1 ACUTE EXACERBATION OF CHRONIC OBSTRUCTIVE PULMONARY DISEASE (MULTI): Status: RESOLVED | Noted: 2025-01-09 | Resolved: 2025-01-09

## 2025-01-09 PROBLEM — H10.10 ALLERGIC CONJUNCTIVITIS: Status: RESOLVED | Noted: 2025-01-09 | Resolved: 2025-01-09

## 2025-01-09 RX ORDER — NALOXONE HYDROCHLORIDE 4 MG/.1ML
SPRAY NASAL
COMMUNITY
Start: 2024-08-29

## 2025-01-09 RX ORDER — BUPROPION HYDROCHLORIDE 300 MG/1
TABLET ORAL
COMMUNITY
Start: 2025-01-01

## 2025-01-13 ENCOUNTER — APPOINTMENT (OUTPATIENT)
Dept: PRIMARY CARE | Facility: CLINIC | Age: 70
End: 2025-01-13
Payer: MEDICARE

## 2025-01-13 VITALS
OXYGEN SATURATION: 97 % | DIASTOLIC BLOOD PRESSURE: 62 MMHG | HEART RATE: 74 BPM | TEMPERATURE: 97.6 F | SYSTOLIC BLOOD PRESSURE: 102 MMHG | HEIGHT: 60 IN | WEIGHT: 121 LBS | BODY MASS INDEX: 23.75 KG/M2

## 2025-01-13 DIAGNOSIS — R91.8 LUNG NODULES: ICD-10-CM

## 2025-01-13 DIAGNOSIS — M80.08XA AGE-RELATED OSTEOPOROSIS WITH CURRENT PATHOLOGICAL FRACTURE, VERTEBRA(E), INITIAL ENCOUNTER FOR FRACTURE (MULTI): ICD-10-CM

## 2025-01-13 DIAGNOSIS — Z00.00 ROUTINE GENERAL MEDICAL EXAMINATION AT HEALTH CARE FACILITY: Primary | ICD-10-CM

## 2025-01-13 DIAGNOSIS — Z12.11 SCREENING FOR COLORECTAL CANCER: ICD-10-CM

## 2025-01-13 DIAGNOSIS — L73.9 FOLLICULITIS: ICD-10-CM

## 2025-01-13 DIAGNOSIS — F32.1 MODERATE SINGLE CURRENT EPISODE OF MAJOR DEPRESSIVE DISORDER (MULTI): ICD-10-CM

## 2025-01-13 DIAGNOSIS — J43.1 PANLOBULAR EMPHYSEMA (MULTI): ICD-10-CM

## 2025-01-13 DIAGNOSIS — Z12.12 SCREENING FOR COLORECTAL CANCER: ICD-10-CM

## 2025-01-13 DIAGNOSIS — E78.2 COMBINED HYPERLIPIDEMIA: ICD-10-CM

## 2025-01-13 PROBLEM — H02.413 MECHANICAL PTOSIS OF EYELID OF BOTH EYES: Status: RESOLVED | Noted: 2025-01-09 | Resolved: 2025-01-13

## 2025-01-13 PROBLEM — R07.89 RIGHT-SIDED CHEST WALL PAIN: Status: RESOLVED | Noted: 2024-05-10 | Resolved: 2025-01-13

## 2025-01-13 PROCEDURE — 1036F TOBACCO NON-USER: CPT | Performed by: FAMILY MEDICINE

## 2025-01-13 PROCEDURE — 1160F RVW MEDS BY RX/DR IN RCRD: CPT | Performed by: FAMILY MEDICINE

## 2025-01-13 PROCEDURE — 1124F ACP DISCUSS-NO DSCNMKR DOCD: CPT | Performed by: FAMILY MEDICINE

## 2025-01-13 PROCEDURE — 1170F FXNL STATUS ASSESSED: CPT | Performed by: FAMILY MEDICINE

## 2025-01-13 PROCEDURE — 3008F BODY MASS INDEX DOCD: CPT | Performed by: FAMILY MEDICINE

## 2025-01-13 PROCEDURE — 99214 OFFICE O/P EST MOD 30 MIN: CPT | Performed by: FAMILY MEDICINE

## 2025-01-13 PROCEDURE — 1159F MED LIST DOCD IN RCRD: CPT | Performed by: FAMILY MEDICINE

## 2025-01-13 PROCEDURE — G0439 PPPS, SUBSEQ VISIT: HCPCS | Performed by: FAMILY MEDICINE

## 2025-01-13 RX ORDER — BUDESONIDE AND FORMOTEROL FUMARATE DIHYDRATE 160; 4.5 UG/1; UG/1
2 AEROSOL RESPIRATORY (INHALATION)
Start: 2025-01-13

## 2025-01-13 RX ORDER — ZINC GLUCONATE 100 MG
100 TABLET ORAL DAILY
COMMUNITY

## 2025-01-13 RX ORDER — MUPIROCIN 20 MG/G
OINTMENT TOPICAL 3 TIMES DAILY
Qty: 22 G | Refills: 0 | Status: SHIPPED | OUTPATIENT
Start: 2025-01-13 | End: 2025-01-23

## 2025-01-13 ASSESSMENT — ACTIVITIES OF DAILY LIVING (ADL)
GROCERY_SHOPPING: INDEPENDENT
MANAGING_FINANCES: INDEPENDENT
TAKING_MEDICATION: INDEPENDENT
DRESSING: INDEPENDENT
DOING_HOUSEWORK: INDEPENDENT
BATHING: INDEPENDENT

## 2025-01-13 ASSESSMENT — ENCOUNTER SYMPTOMS
NAUSEA: 0
DEPRESSION: 1
APPETITE CHANGE: 0
UNEXPECTED WEIGHT CHANGE: 0
OCCASIONAL FEELINGS OF UNSTEADINESS: 0
LOSS OF SENSATION IN FEET: 0

## 2025-01-13 NOTE — PROGRESS NOTES
Subjective   Patient ID: Rebekah Murillo is a 69 y.o. female who presents for Medicare Annual Wellness Visit Subsequent (Rebekah is here today for medicare annual wellness visit and routine F/U. Pt would like to discuss her most recent test results. ).    HPI     Review of Systems   Constitutional:  Negative for appetite change and unexpected weight change.   Eyes:  Negative for visual disturbance.   Gastrointestinal:  Negative for nausea.       Objective   /62 (BP Location: Left arm, Patient Position: Sitting)   Pulse 74   Temp 36.4 °C (97.6 °F) (Temporal)   Ht 1.524 m (5')   Wt 54.9 kg (121 lb)   SpO2 97%   BMI 23.63 kg/m²     Physical Exam  HENT:      Head: Normocephalic and atraumatic.      Nose: Nose normal.      Mouth/Throat:      Mouth: Mucous membranes are moist.      Pharynx: No oropharyngeal exudate.   Eyes:      Extraocular Movements: Extraocular movements intact.      Conjunctiva/sclera: Conjunctivae normal.      Pupils: Pupils are equal, round, and reactive to light.   Cardiovascular:      Rate and Rhythm: Normal rate and regular rhythm.   Pulmonary:      Effort: Pulmonary effort is normal.      Breath sounds: Normal breath sounds.   Abdominal:      General: There is no distension.      Palpations: Abdomen is soft.   Musculoskeletal:      Cervical back: Normal range of motion and neck supple.   Lymphadenopathy:      Cervical: No cervical adenopathy.   Neurological:      General: No focal deficit present.      Mental Status: She is alert.   Psychiatric:         Attention and Perception: Attention normal.         Speech: Speech normal.         Behavior: Behavior is cooperative.         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     Low-fat low-cholesterol diet         COPD (chronic obstructive pulmonary disease) (Multi) J44.9     Patient has stopped smoking and I have encouraged her to continue to be a non-smoker          Relevant Medications    budesonide-formoteroL (Symbicort) 160-4.5 mcg/actuation inhaler    Lung nodules R91.8     Reviewed CT scan in detail with patient         Moderate single current episode of major depressive disorder (Multi) F32.1     Monitor and controlled         Age-related osteoporosis with current pathological fracture, vertebra(e), initial encounter for fracture (Multi) M80.08XA     Forteo started by rheumatology          Other Visit Diagnoses         Codes    Routine general medical examination at health care facility    -  Primary Z00.00    Relevant Orders    1 Year Follow Up In Primary Care - Wellness Exam    Folliculitis     L73.9    Relevant Medications    mupirocin (Bactroban) 2 % ointment    Screening for colorectal cancer     Z12.11, Z12.12    Relevant Orders    Colonoscopy Screening; Average Risk Patient        HM LM discussed  Reviewed labs and x-ray results  Recheck 6-12 months sooner if any issues arise  If back is hurting worse consider further kyphoplasty but patient says she will never do that again

## 2025-01-23 ENCOUNTER — SPECIALTY PHARMACY (OUTPATIENT)
Dept: PHARMACY | Facility: CLINIC | Age: 70
End: 2025-01-23

## 2025-01-23 PROCEDURE — RXMED WILLOW AMBULATORY MEDICATION CHARGE

## 2025-01-24 ENCOUNTER — PHARMACY VISIT (OUTPATIENT)
Dept: PHARMACY | Facility: CLINIC | Age: 70
End: 2025-01-24
Payer: MEDICARE

## 2025-02-05 ENCOUNTER — SPECIALTY PHARMACY (OUTPATIENT)
Dept: PHARMACY | Facility: CLINIC | Age: 70
End: 2025-02-05

## 2025-02-05 NOTE — PROGRESS NOTES
[facility] Clinical Note    Rebekah Murillo is a 69 y.o. female, who is on the specialty pharmacy service for management of:  Osteoporosis Core.    Rebekah Murillo is taking: Forteo.        Rebekah was contacted on 2/5/2025 at 2:50 PM for a virtual pharmacy visit with encounter number [id number] from:   [facility]  [address] [place] [id number]  Dept: [phone]  Dept Fax: [phone]    Rebekah was offered a Telemedicine Video visit or Telephone visit.  Rebekah consented to a telephone visit, which was performed.    The most recent encounter visit with the referring prescriber Shabana Guerrero MD on 7/1/2024 was reviewed.  Pharmacy will continue to collaborate in the care of this patient with the referring prescriber Shabana Guerrero MD.    General Assessment      Impression/Plan  IMPRESSION/PLAN:  Is patient high risk (potential patients:  pregnancy, geriatric, pediatric)? Geriatric   Is laboratory follow-up needed? No  Is a clinical intervention needed? No  Next reassessment date? ~8/5/2025  Additional comments:     Refer to the encounter summary report for documentation details about patient counseling and education.      Medication Adherence    The importance of adherence was discussed with the patient and they were advised to take the medication as prescribed by their provider. Patient was encouraged to call their physician's office if they have a question regarding a missed dose.     QOL/Patient Satisfaction  Rate your quality of life on scale of 1-10: 6  Rate your satisfaction with  Specialty Pharmacy on scale of 1-10: 10 - Completely satisfied      Patient was advised to contact the pharmacy if there are any changes to their medication list, including prescriptions, OTC medications, herbal products, or supplements. Patient was advised of [facility]'s dispensing process, refill timeline, contact information  ([phone]), and patient management follow up. Patient confirmed understanding of education conducted during assessment. All patient questions and concerns were addressed to the best of my ability. Patient was encouraged to contact the specialty pharmacy with any questions or concerns.    Confirmed follow-up outreaches are properly scheduled and reviewed goals of therapy with the patient.        Marina Ross, PharmD

## 2025-02-13 ENCOUNTER — APPOINTMENT (OUTPATIENT)
Dept: SURGERY | Facility: CLINIC | Age: 70
End: 2025-02-13
Payer: MEDICARE

## 2025-02-13 VITALS
HEART RATE: 64 BPM | HEIGHT: 60 IN | SYSTOLIC BLOOD PRESSURE: 118 MMHG | BODY MASS INDEX: 24.35 KG/M2 | OXYGEN SATURATION: 93 % | WEIGHT: 124 LBS | DIASTOLIC BLOOD PRESSURE: 69 MMHG

## 2025-02-13 DIAGNOSIS — Z12.11 ENCOUNTER FOR SCREENING FOR MALIGNANT NEOPLASM OF COLON: Primary | ICD-10-CM

## 2025-02-13 PROCEDURE — 1123F ACP DISCUSS/DSCN MKR DOCD: CPT | Performed by: SURGERY

## 2025-02-13 PROCEDURE — 3008F BODY MASS INDEX DOCD: CPT | Performed by: SURGERY

## 2025-02-13 PROCEDURE — 1159F MED LIST DOCD IN RCRD: CPT | Performed by: SURGERY

## 2025-02-13 PROCEDURE — 99203 OFFICE O/P NEW LOW 30 MIN: CPT | Performed by: SURGERY

## 2025-02-13 NOTE — PROGRESS NOTES
Subjective   Patient ID: Rebekah Murillo is a 69 y.o. female who presents for New Patient Visit.  HPI this is a very pleasant patient that apparently I saw in the remote past for colonoscopy but the patient states that could have been close to 20 years ago.  The patient is medically fairly healthy but she has terrible problems with osteoporosis and has multiple compression fractures of the spine.  This started last spring with the couple of fractures and she has had decreased activity and for short period of time was on some narcotic pain medication as well.  Her constipation issues began then and but she has since remedied that with prunes and prune juice.  She denies seeing any blood.  She now has very frequent bowel movements and they are loose.  She denies any nausea or vomiting    Social history the patient used to smoke probably for over 50 years but she quit a year and a half ago.  She does not drink alcohol or smoke marijuana.    Review of Systems 10 point review is otherwise negative    Objective   Physical Exam head is normocephalic atraumatic eyes extraocular movements are intact pupils are equal and round lungs are clear bilaterally but she does not have great air exchange.  Heart tones are regular.  Extremities do not reveal any gross deformities.  The patient moves very slowly because of the discomfort in her back.    Assessment/Plan impression at this time is that of likely pain medication or narcotic induced constipation that is now resolved with increasing her fiber intake.  The patient however has not had a colonoscopy for many years and is a candidate for screening colonoscopy.  We discussed the procedure including the risks of bleeding perforation and missed lesion and she agrees to proceed           Yazmin Escalera MD 02/13/25 3:49 PM

## 2025-02-20 ENCOUNTER — SPECIALTY PHARMACY (OUTPATIENT)
Dept: PHARMACY | Facility: CLINIC | Age: 70
End: 2025-02-20

## 2025-02-20 PROCEDURE — RXMED WILLOW AMBULATORY MEDICATION CHARGE

## 2025-02-24 ENCOUNTER — PHARMACY VISIT (OUTPATIENT)
Dept: PHARMACY | Facility: CLINIC | Age: 70
End: 2025-02-24
Payer: MEDICARE

## 2025-03-18 ENCOUNTER — SPECIALTY PHARMACY (OUTPATIENT)
Dept: PHARMACY | Facility: CLINIC | Age: 70
End: 2025-03-18

## 2025-03-19 ENCOUNTER — SPECIALTY PHARMACY (OUTPATIENT)
Dept: PHARMACY | Facility: CLINIC | Age: 70
End: 2025-03-19

## 2025-03-20 PROCEDURE — RXMED WILLOW AMBULATORY MEDICATION CHARGE

## 2025-03-21 ENCOUNTER — APPOINTMENT (OUTPATIENT)
Dept: OPERATING ROOM | Facility: HOSPITAL | Age: 70
End: 2025-03-21
Payer: MEDICARE

## 2025-03-24 ENCOUNTER — PHARMACY VISIT (OUTPATIENT)
Dept: PHARMACY | Facility: CLINIC | Age: 70
End: 2025-03-24
Payer: MEDICARE

## 2025-03-27 ENCOUNTER — ANESTHESIA EVENT (OUTPATIENT)
Dept: OPERATING ROOM | Facility: HOSPITAL | Age: 70
End: 2025-03-27
Payer: MEDICARE

## 2025-03-27 RX ORDER — ONDANSETRON HYDROCHLORIDE 2 MG/ML
8 INJECTION, SOLUTION INTRAVENOUS ONCE
Status: CANCELLED | OUTPATIENT
Start: 2025-03-27 | End: 2025-03-27

## 2025-03-27 RX ORDER — ACETAMINOPHEN 325 MG/1
650 TABLET ORAL EVERY 4 HOURS PRN
Status: CANCELLED | OUTPATIENT
Start: 2025-03-27

## 2025-03-27 RX ORDER — ALBUTEROL SULFATE 0.83 MG/ML
2.5 SOLUTION RESPIRATORY (INHALATION) ONCE AS NEEDED
Status: CANCELLED | OUTPATIENT
Start: 2025-03-27

## 2025-03-28 ENCOUNTER — ANESTHESIA (OUTPATIENT)
Dept: OPERATING ROOM | Facility: HOSPITAL | Age: 70
End: 2025-03-28
Payer: MEDICARE

## 2025-03-28 ENCOUNTER — HOSPITAL ENCOUNTER (OUTPATIENT)
Dept: OPERATING ROOM | Facility: HOSPITAL | Age: 70
Setting detail: OUTPATIENT SURGERY
Discharge: HOME | End: 2025-03-28
Payer: MEDICARE

## 2025-03-28 VITALS
HEIGHT: 60 IN | SYSTOLIC BLOOD PRESSURE: 115 MMHG | OXYGEN SATURATION: 99 % | BODY MASS INDEX: 23.85 KG/M2 | RESPIRATION RATE: 16 BRPM | WEIGHT: 121.47 LBS | TEMPERATURE: 96.8 F | HEART RATE: 72 BPM | DIASTOLIC BLOOD PRESSURE: 64 MMHG

## 2025-03-28 DIAGNOSIS — Z12.12 SCREENING FOR COLORECTAL CANCER: ICD-10-CM

## 2025-03-28 DIAGNOSIS — Z12.11 SCREENING FOR COLORECTAL CANCER: ICD-10-CM

## 2025-03-28 PROCEDURE — 7100000010 HC PHASE TWO TIME - EACH INCREMENTAL 1 MINUTE: Performed by: ANESTHESIOLOGY

## 2025-03-28 PROCEDURE — 7100000009 HC PHASE TWO TIME - INITIAL BASE CHARGE: Performed by: ANESTHESIOLOGY

## 2025-03-28 PROCEDURE — 3700000001 HC GENERAL ANESTHESIA TIME - INITIAL BASE CHARGE: Performed by: ANESTHESIOLOGY

## 2025-03-28 PROCEDURE — 2500000004 HC RX 250 GENERAL PHARMACY W/ HCPCS (ALT 636 FOR OP/ED): Performed by: ANESTHESIOLOGY

## 2025-03-28 PROCEDURE — 3600000007 HC OR TIME - EACH INCREMENTAL 1 MINUTE - PROCEDURE LEVEL TWO: Performed by: ANESTHESIOLOGY

## 2025-03-28 PROCEDURE — 3700000002 HC GENERAL ANESTHESIA TIME - EACH INCREMENTAL 1 MINUTE: Performed by: ANESTHESIOLOGY

## 2025-03-28 PROCEDURE — 3600000002 HC OR TIME - INITIAL BASE CHARGE - PROCEDURE LEVEL TWO: Performed by: ANESTHESIOLOGY

## 2025-03-28 PROCEDURE — 2500000004 HC RX 250 GENERAL PHARMACY W/ HCPCS (ALT 636 FOR OP/ED): Performed by: NURSE ANESTHETIST, CERTIFIED REGISTERED

## 2025-03-28 RX ORDER — PROPOFOL 10 MG/ML
INJECTION, EMULSION INTRAVENOUS AS NEEDED
Status: DISCONTINUED | OUTPATIENT
Start: 2025-03-28 | End: 2025-03-28

## 2025-03-28 RX ORDER — VITS A,C,E/LUTEIN/MINERALS 300MCG-200
200 TABLET ORAL
COMMUNITY

## 2025-03-28 RX ORDER — SODIUM CHLORIDE, SODIUM LACTATE, POTASSIUM CHLORIDE, CALCIUM CHLORIDE 600; 310; 30; 20 MG/100ML; MG/100ML; MG/100ML; MG/100ML
100 INJECTION, SOLUTION INTRAVENOUS CONTINUOUS
Status: ACTIVE | OUTPATIENT
Start: 2025-03-28 | End: 2025-03-28

## 2025-03-28 RX ORDER — LIDOCAINE HYDROCHLORIDE 10 MG/ML
INJECTION, SOLUTION EPIDURAL; INFILTRATION; INTRACAUDAL; PERINEURAL AS NEEDED
Status: DISCONTINUED | OUTPATIENT
Start: 2025-03-28 | End: 2025-03-28

## 2025-03-28 RX ORDER — MIDAZOLAM HYDROCHLORIDE 1 MG/ML
INJECTION, SOLUTION INTRAMUSCULAR; INTRAVENOUS AS NEEDED
Status: DISCONTINUED | OUTPATIENT
Start: 2025-03-28 | End: 2025-03-28

## 2025-03-28 RX ADMIN — SODIUM CHLORIDE, SODIUM LACTATE, POTASSIUM CHLORIDE, AND CALCIUM CHLORIDE 100 ML/HR: .6; .31; .03; .02 INJECTION, SOLUTION INTRAVENOUS at 11:01

## 2025-03-28 RX ADMIN — PROPOFOL 140 MCG/KG/MIN: 10 INJECTION, EMULSION INTRAVENOUS at 11:44

## 2025-03-28 RX ADMIN — PROPOFOL 50 MG: 10 INJECTION, EMULSION INTRAVENOUS at 11:43

## 2025-03-28 RX ADMIN — PROPOFOL 20 MG: 10 INJECTION, EMULSION INTRAVENOUS at 11:50

## 2025-03-28 RX ADMIN — MIDAZOLAM 2 MG: 1 INJECTION INTRAMUSCULAR; INTRAVENOUS at 11:34

## 2025-03-28 RX ADMIN — LIDOCAINE HYDROCHLORIDE 20 MG: 10 SOLUTION INTRAVENOUS at 11:43

## 2025-03-28 RX ADMIN — PROPOFOL 20 MG: 10 INJECTION, EMULSION INTRAVENOUS at 12:02

## 2025-03-28 RX ADMIN — PROPOFOL 20 MG: 10 INJECTION, EMULSION INTRAVENOUS at 11:55

## 2025-03-28 SDOH — HEALTH STABILITY: MENTAL HEALTH: CURRENT SMOKER: 0

## 2025-03-28 ASSESSMENT — ENCOUNTER SYMPTOMS
NEUROLOGICAL NEGATIVE: 1
CARDIOVASCULAR NEGATIVE: 1
HEMATOLOGIC/LYMPHATIC NEGATIVE: 1
GASTROINTESTINAL NEGATIVE: 1
PSYCHIATRIC NEGATIVE: 1
BACK PAIN: 1
CONSTITUTIONAL NEGATIVE: 1
ENDOCRINE NEGATIVE: 1
ALLERGIC/IMMUNOLOGIC NEGATIVE: 1
RESPIRATORY NEGATIVE: 1
EYES NEGATIVE: 1

## 2025-03-28 ASSESSMENT — PAIN - FUNCTIONAL ASSESSMENT
PAIN_FUNCTIONAL_ASSESSMENT: UNABLE TO SELF-REPORT
PAIN_FUNCTIONAL_ASSESSMENT: 0-10
PAIN_FUNCTIONAL_ASSESSMENT: 0-10

## 2025-03-28 ASSESSMENT — PAIN SCALES - GENERAL
PAIN_LEVEL: 2
PAINLEVEL_OUTOF10: 0 - NO PAIN
PAINLEVEL_OUTOF10: 10 - WORST POSSIBLE PAIN

## 2025-03-28 NOTE — H&P
History Of Present Illness  Rebekah Murillo is a 69 y.o. female presenting for colonoscopy.     Pt is here today for colonoscopy with Dr. Escalera    Last food intake Wednesday 3/25/25 at 2300  Started prep yesterday at 1700  Finished prep this am at 0500  Last fluid intake this am at 0615  Stools are now liquid yellow.    Problems with prep: nausea but no vomiting    History of anesthesia complications: no    Willing to accept blood product if necessary: yes    Hx of smoking, has inhalers but does not use because she does not feel that she needs them. Denies sx of SOB, coughing, tightness wheezing.     Past Medical History  Past Medical History:   Diagnosis Date    Acute bacterial conjunctivitis of both eyes 09/20/2023    Acute exacerbation of chronic obstructive pulmonary disease (Multi) 01/09/2025    Acute non-recurrent maxillary sinusitis 09/20/2023    Acute pain of right knee 09/20/2023    Allergic conjunctivitis 01/09/2025    Arthritis unsure    Bronchitis, acute 09/20/2023    Chronic back pain     Chronic bronchitis (Multi) 25 years ago    1 x year    COPD (chronic obstructive pulmonary disease) (Multi)     Depression 30 years ago    medicated    Ex-smoker     quit 9/2023    Fractures 2012(x2), 2016, 2024    HL (hearing loss) 20 years ago    Osteoporosis     Urticaria, acute 09/20/2023    Vision loss 30 years ago    glasses       Surgical History  Past Surgical History:   Procedure Laterality Date    ELBOW SURGERY Left     EYE SURGERY Left     FINGER SURGERY Left     index    TUBAL LIGATION Bilateral         Social History  She reports that she quit smoking about 18 months ago. Her smoking use included cigarettes. She started smoking about 51 years ago. She has a 12.5 pack-year smoking history. She has never used smokeless tobacco. She reports current alcohol use. She reports that she does not use drugs.    Family History  Family History   Problem Relation Name Age of Onset    Arthritis Mother Samanthajagdish Musa      Hearing loss Mother Samantha Musa     Heart disease Mother Samantha Musa     Vision loss Mother Samantha Musa     Alcohol abuse Father Gurvinder Musa -       Cancer Father Gurvinder Musa -       Cancer Maternal Grandfather Nolan Englishaw - Dec.     COPD Maternal Grandfather Nolan Buenoshaw - Dec.     Vision loss Maternal Grandfather Nolan Buenoshaw - Dec.     Hearing loss Maternal Grandmother Josselin Buenoshaw - Dec     Vision loss Maternal Grandmother Josselin Buenoshaw - Dec     Alcohol abuse Paternal Grandmother Rebekah Musa -          Allergies  Adhesive tape-silicones    Review of Systems   Constitutional: Negative.    HENT: Negative.     Eyes: Negative.    Respiratory: Negative.     Cardiovascular: Negative.    Gastrointestinal: Negative.    Endocrine: Negative.    Genitourinary: Negative.    Musculoskeletal:  Positive for back pain (chronic mid line lower back pain since compression fractures in lower spine).   Skin: Negative.    Allergic/Immunologic: Negative.    Neurological: Negative.    Hematological: Negative.    Psychiatric/Behavioral: Negative.          Physical Exam  Vitals reviewed.   Constitutional:       General: She is not in acute distress.     Appearance: Normal appearance. She is normal weight. She is not ill-appearing, toxic-appearing or diaphoretic.   HENT:      Head: Normocephalic and atraumatic.      Mouth/Throat:      Mouth: Mucous membranes are moist.   Eyes:      General: No scleral icterus.        Right eye: No discharge.         Left eye: No discharge.      Conjunctiva/sclera: Conjunctivae normal.   Cardiovascular:      Rate and Rhythm: Normal rate and regular rhythm.      Pulses: Normal pulses.      Heart sounds: Normal heart sounds. No murmur heard.     No friction rub. No gallop.   Pulmonary:      Effort: Pulmonary effort is normal. No respiratory distress.      Breath sounds: Normal breath sounds. No stridor. No wheezing, rhonchi or rales.   Chest:      Chest wall: No  tenderness.   Abdominal:      General: Bowel sounds are normal. There is no distension.      Palpations: Abdomen is soft. There is no mass.      Tenderness: There is no abdominal tenderness. There is no guarding or rebound.      Hernia: No hernia is present.   Musculoskeletal:      Right lower leg: No edema.      Left lower leg: No edema.   Skin:     General: Skin is warm and dry.      Capillary Refill: Capillary refill takes less than 2 seconds.   Neurological:      Mental Status: She is alert and oriented to person, place, and time.   Psychiatric:         Mood and Affect: Mood normal.         Behavior: Behavior normal.         Thought Content: Thought content normal.         Judgment: Judgment normal.          Last Recorded Vitals  Blood pressure 119/71, pulse 71, temperature 36.7 °C (98.1 °F), resp. rate 16, height 1.524 m (5'), weight 55.1 kg (121 lb 7.6 oz), SpO2 97%.    Relevant Results             Assessment/Plan   Assessment & Plan  Screening for colorectal cancer      Screening for colon cancer with colonoscopy   - plan for colonoscopy today  - NPO  - IVF at 20 ml /hour  - plan for discharge home after procedure.   -  is , Ra  - additional recommendations to be determined based on results and pathologies from procedure today.      Elaine Larson, LISA-CNP         I spent 20 minutes in the professional and overall care of this patient.      LISA Loredo-CNP

## 2025-03-28 NOTE — ANESTHESIA POSTPROCEDURE EVALUATION
Patient: Rebekah Murillo    Procedure Summary       Date: 03/28/25 Room / Location: [facility] OR    Anesthesia Start: 1134 Anesthesia Stop: 1211    Procedure: COLONOSCOPY Diagnosis: Screening for colorectal cancer    Scheduled Providers: Yazmin Escalera MD; Lasha Camp MD Responsible Provider: Lasha Camp MD    Anesthesia Type: MAC ASA Status: 2            Anesthesia Type: MAC    Vitals Value Taken Time   /64 03/28/25 1223   Temp 36 °C (96.8 °F) 03/28/25 1208   Pulse 72 03/28/25 1223   Resp 16 03/28/25 1223   SpO2 99 % 03/28/25 1223       Anesthesia Post Evaluation    Patient location during evaluation: PACU  Patient participation: complete - patient participated  Level of consciousness: awake  Pain score: 2  Pain management: adequate  Multimodal analgesia pain management approach  Airway patency: patent  Two or more strategies used to mitigate risk of obstructive sleep apnea  Cardiovascular status: acceptable  Respiratory status: acceptable  Hydration status: acceptable  Postoperative Nausea and Vomiting: none    No notable events documented.

## 2025-03-28 NOTE — ANESTHESIA PREPROCEDURE EVALUATION
Patient: Rebekah Murillo    Procedure Information       Date/Time: 03/28/25 1150    Scheduled providers: Yazmin Escalera MD; Lasha Camp MD    Procedure: COLONOSCOPY    Location: [facility] OR            Relevant Problems   Cardiac   (+) Combined hyperlipidemia      Pulmonary   (+) COPD (chronic obstructive pulmonary disease) (Multi)   (+) COPD exacerbation (Multi)   (+) Lung nodules      Neuro   (+) Moderate single current episode of major depressive disorder (Multi)      Musculoskeletal   (+) Chronic low back pain       Clinical information reviewed:                   NPO Detail:  No data recorded     Physical Exam    Airway  Mallampati: II  TM distance: >3 FB  Neck ROM: full     Cardiovascular    Dental    Pulmonary    Abdominal        Anesthesia Plan    History of general anesthesia?: yes  History of complications of general anesthesia?: no    ASA 2     MAC     The patient is not a current smoker.    intravenous induction   Anesthetic plan and risks discussed with patient.    Plan discussed with CRNA and attending.

## 2025-03-28 NOTE — DISCHARGE INSTRUCTIONS
Patient Instructions after a Colonoscopy      The anesthetics, sedatives or narcotics which were given to you today will be acting in your body for the next 24 hours, so you might feel a little sleepy or groggy.  This feeling should slowly wear off. Carefully read and follow the instructions.     You received sedation today:  - Do not drive or operate any machinery or power tools of any kind.   - No alcoholic beverages today, not even beer or wine.  - Do not make any important decisions or sign any legal documents.  - No over the counter medications that contain alcohol or that may cause drowsiness.  - Do not make any important decisions or sign any legal documents.  - Make sure you have someone with you for first 24 hours.    While it is common to experience mild to moderate abdominal distention, gas, or belching after your procedure, if any of these symptoms occur following discharge from the GI Lab or within one week of having your procedure, call the [facility] to be advised whether a visit to your nearest Urgent Care or Emergency Department is indicated.  Take this paper with you if you go.     - If you develop an allergic reaction to the medications that were given during your procedure such as difficulty breathing, rash, hives, severe nausea, vomiting or lightheadedness.  - If you experience chest pain, shortness of breath, severe abdominal pain, fevers and chills.  -If you develop signs and symptoms of bleeding such as blood in your spit, if your stools turn black, tarry, or bloody  - If you have not urinated within 8 hours following your procedure.  - If your IV site becomes painful, red, inflamed, or looks infected.    If you received a biopsy/polypectomy/sphincterotomy the following instructions apply below:    __ Do not use Aspirin containing products, non-steroidal medications or anti-coagulants for one week following your procedure. (Examples of these types of medications are: Advil,  Arthrotec, Aleve, Coumadin, Ecotrin, Heparin, Ibuprofen, Indocin, Motrin, Naprosyn, Nuprin, Plavix, Vioxx, and Voltarin, or their generic forms.  This list is not all-inclusive.  Check with your physician or pharmacist before resuming medications.)   __ Eat a soft diet today.  Avoid foods that are poorly digested for the next 24 hours.  These foods would include: nuts, beans, lettuce, red meats, and fried foods. Start with liquids and advance your diet as tolerated, gradually work up to eating solids.   __ Do not have a Barium Study or Enema for one week.    Your physician recommends the additional following instructions:    -You have a contact number available for emergencies. The signs and symptoms of potential delayed complications were discussed with you. You may return to normal activities tomorrow.  -Resume your previous diet.  -Continue your present medications.   -We are waiting for your pathology results.  -Your physician has recommended a repeat colonoscopy (date to be determined after pending pathology results are reviewed) for surveillance based on pathology results.  -The findings and recommendations have been discussed with you.  -The findings and recommendations were discussed with your family.  - Please see Medication Reconciliation Form for new medication/medications prescribed.       If you experience any problems or have any questions following discharge from the GI Lab, please call:        Nurse Signature                                                                        Date___________________                                                                            Patient/Responsible Party Signature                                        Date___________________

## 2025-03-28 NOTE — H&P
History Of Present Illness  Rebekah Murillo is a 69 y.o. female presenting with hx polyps.     Past Medical History  Past Medical History:   Diagnosis Date    Acute bacterial conjunctivitis of both eyes 2023    Acute exacerbation of chronic obstructive pulmonary disease (Multi) 2025    Acute non-recurrent maxillary sinusitis 2023    Acute pain of right knee 2023    Allergic conjunctivitis 2025    Arthritis unsure    Bronchitis, acute 2023    Chronic back pain     Chronic bronchitis (Multi) 25 years ago    1 x year    COPD (chronic obstructive pulmonary disease) (Multi)     Depression 30 years ago    medicated    Ex-smoker     quit 2023    Fractures (x2), ,     HL (hearing loss) 20 years ago    Osteoporosis     Urticaria, acute 2023    Vision loss 30 years ago    glasses       Surgical History  Past Surgical History:   Procedure Laterality Date    ELBOW SURGERY Left     EYE SURGERY Left     FINGER SURGERY Left     index    TUBAL LIGATION Bilateral         Social History  She reports that she quit smoking about 18 months ago. Her smoking use included cigarettes. She started smoking about 51 years ago. She has a 12.5 pack-year smoking history. She has never used smokeless tobacco. She reports current alcohol use. She reports that she does not use drugs.    Family History  Family History   Problem Relation Name Age of Onset    Arthritis Mother Samantha Musa     Hearing loss Mother Samantha Musa     Heart disease Mother Samantha Musa     Vision loss Mother Samantha Musa     Alcohol abuse Father Gurvinder Musa -       Cancer Father Gurvinder Musa -       Cancer Maternal Grandfather Nolan Sher.     COPD Maternal Grandfather Nolan Sher.     Vision loss Maternal Grandfather Nolan Sher.     Hearing loss Maternal Grandmother Josselin Sher     Vision loss Maternal Grandmother Josselin Sher     Alcohol abuse Paternal  Grandmother Rebekah Musa -          Allergies  Adhesive tape-silicones    Review of Systemsneg     Physical Examlunfgs clear  Heart RRR     Last Recorded Vitals  Blood pressure 119/71, pulse 71, temperature 36.7 °C (98.1 °F), resp. rate 16, height 1.524 m (5'), weight 55.1 kg (121 lb 7.6 oz), SpO2 97%.    Relevant Results             Assessment/Plan   Assessment & Plan  Screening for colorectal cancer             I spent 10 minutes in the professional and overall care of this patient.      Yazmin Escalera MD

## 2025-04-03 LAB
LABORATORY COMMENT REPORT: NORMAL
PATH REPORT.FINAL DX SPEC: NORMAL
PATH REPORT.GROSS SPEC: NORMAL
PATH REPORT.RELEVANT HX SPEC: NORMAL
PATH REPORT.TOTAL CANCER: NORMAL

## 2025-04-13 ENCOUNTER — HOSPITAL ENCOUNTER (INPATIENT)
Facility: HOSPITAL | Age: 70
LOS: 1 days | Discharge: HOME | DRG: 564 | End: 2025-04-15
Attending: STUDENT IN AN ORGANIZED HEALTH CARE EDUCATION/TRAINING PROGRAM | Admitting: INTERNAL MEDICINE
Payer: MEDICARE

## 2025-04-13 ENCOUNTER — APPOINTMENT (OUTPATIENT)
Dept: CARDIOLOGY | Facility: HOSPITAL | Age: 70
DRG: 564 | End: 2025-04-13
Payer: MEDICARE

## 2025-04-13 ENCOUNTER — APPOINTMENT (OUTPATIENT)
Dept: RADIOLOGY | Facility: HOSPITAL | Age: 70
DRG: 564 | End: 2025-04-13
Payer: MEDICARE

## 2025-04-13 DIAGNOSIS — M54.50 LUMBAR SPINE PAIN: ICD-10-CM

## 2025-04-13 DIAGNOSIS — R07.89 CHEST WALL PAIN: Primary | ICD-10-CM

## 2025-04-13 DIAGNOSIS — R91.1 LUNG NODULE: ICD-10-CM
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
## 2025-04-13 LAB
ALBUMIN SERPL BCP-MCNC: 4.5 G/DL (ref 3.4–5)
ALP SERPL-CCNC: 94 U/L (ref 33–136)
ALT SERPL W P-5'-P-CCNC: 15 U/L (ref 7–45)
ANION GAP BLDV CALCULATED.4IONS-SCNC: 5 MMOL/L (ref 10–25)
ANION GAP SERPL CALC-SCNC: 10 MMOL/L (ref 10–20)
AST SERPL W P-5'-P-CCNC: 18 U/L (ref 9–39)
BASE EXCESS BLDV CALC-SCNC: 4.9 MMOL/L (ref -2–3)
BASOPHILS # BLD AUTO: 0.03 X10*3/UL (ref 0–0.1)
BASOPHILS NFR BLD AUTO: 0.3 %
BILIRUB SERPL-MCNC: 0.3 MG/DL (ref 0–1.2)
BODY TEMPERATURE: ABNORMAL
BUN SERPL-MCNC: 16 MG/DL (ref 6–23)
CA-I BLDV-SCNC: 1.31 MMOL/L (ref 1.1–1.33)
CALCIUM SERPL-MCNC: 10.2 MG/DL (ref 8.6–10.3)
CARDIAC TROPONIN I PNL SERPL HS: 3 NG/L (ref 0–13)
CARDIAC TROPONIN I PNL SERPL HS: 4 NG/L (ref 0–13)
CHLORIDE BLDV-SCNC: 100 MMOL/L (ref 98–107)
CHLORIDE SERPL-SCNC: 102 MMOL/L (ref 98–107)
CO2 SERPL-SCNC: 29 MMOL/L (ref 21–32)
CREAT SERPL-MCNC: 0.77 MG/DL (ref 0.5–1.05)
EGFRCR SERPLBLD CKD-EPI 2021: 84 ML/MIN/1.73M*2
EOSINOPHIL # BLD AUTO: 0 X10*3/UL (ref 0–0.7)
EOSINOPHIL NFR BLD AUTO: 0 %
ERYTHROCYTE [DISTWIDTH] IN BLOOD BY AUTOMATED COUNT: 12 % (ref 11.5–14.5)
GLUCOSE BLDV-MCNC: 130 MG/DL (ref 74–99)
GLUCOSE SERPL-MCNC: 129 MG/DL (ref 74–99)
HCO3 BLDV-SCNC: 31.1 MMOL/L (ref 22–26)
HCT VFR BLD AUTO: 40.5 % (ref 36–46)
HCT VFR BLD EST: 56 % (ref 36–46)
HGB BLD-MCNC: 13.9 G/DL (ref 12–16)
HGB BLDV-MCNC: 18.7 G/DL (ref 12–16)
IMM GRANULOCYTES # BLD AUTO: 0.06 X10*3/UL (ref 0–0.7)
IMM GRANULOCYTES NFR BLD AUTO: 0.5 % (ref 0–0.9)
INHALED O2 CONCENTRATION: 21 %
LACTATE BLDV-SCNC: 1.8 MMOL/L (ref 0.4–2)
LACTATE SERPL-SCNC: 1.5 MMOL/L (ref 0.4–2)
LYMPHOCYTES # BLD AUTO: 0.78 X10*3/UL (ref 1.2–4.8)
LYMPHOCYTES NFR BLD AUTO: 6.6 %
MCH RBC QN AUTO: 33.2 PG (ref 26–34)
MCHC RBC AUTO-ENTMCNC: 34.3 G/DL (ref 32–36)
MCV RBC AUTO: 97 FL (ref 80–100)
MONOCYTES # BLD AUTO: 0.73 X10*3/UL (ref 0.1–1)
MONOCYTES NFR BLD AUTO: 6.1 %
NEUTROPHILS # BLD AUTO: 10.3 X10*3/UL (ref 1.2–7.7)
NEUTROPHILS NFR BLD AUTO: 86.5 %
NRBC BLD-RTO: 0 /100 WBCS (ref 0–0)
OXYHGB MFR BLDV: 42.7 % (ref 45–75)
PCO2 BLDV: 49 MM HG (ref 41–51)
PH BLDV: 7.41 PH (ref 7.33–7.43)
PLATELET # BLD AUTO: 165 X10*3/UL (ref 150–450)
PO2 BLDV: 26 MM HG (ref 35–45)
POTASSIUM BLDV-SCNC: 3.9 MMOL/L (ref 3.5–5.3)
POTASSIUM SERPL-SCNC: 4 MMOL/L (ref 3.5–5.3)
PROT SERPL-MCNC: 6.5 G/DL (ref 6.4–8.2)
RBC # BLD AUTO: 4.19 X10*6/UL (ref 4–5.2)
SAO2 % BLDV: 43 % (ref 45–75)
SODIUM BLDV-SCNC: 132 MMOL/L (ref 136–145)
SODIUM SERPL-SCNC: 137 MMOL/L (ref 136–145)
WBC # BLD AUTO: 11.9 X10*3/UL (ref 4.4–11.3)

## 2025-04-13 PROCEDURE — 2500000004 HC RX 250 GENERAL PHARMACY W/ HCPCS (ALT 636 FOR OP/ED): Performed by: NURSE PRACTITIONER

## 2025-04-13 PROCEDURE — 2500000001 HC RX 250 WO HCPCS SELF ADMINISTERED DRUGS (ALT 637 FOR MEDICARE OP)

## 2025-04-13 PROCEDURE — 96372 THER/PROPH/DIAG INJ SC/IM: CPT

## 2025-04-13 PROCEDURE — 72131 CT LUMBAR SPINE W/O DYE: CPT | Performed by: STUDENT IN AN ORGANIZED HEALTH CARE EDUCATION/TRAINING PROGRAM

## 2025-04-13 PROCEDURE — G0378 HOSPITAL OBSERVATION PER HR: HCPCS

## 2025-04-13 PROCEDURE — 36415 COLL VENOUS BLD VENIPUNCTURE: CPT | Performed by: NURSE PRACTITIONER

## 2025-04-13 PROCEDURE — 93005 ELECTROCARDIOGRAM TRACING: CPT

## 2025-04-13 PROCEDURE — 84484 ASSAY OF TROPONIN QUANT: CPT | Performed by: NURSE PRACTITIONER

## 2025-04-13 PROCEDURE — 96375 TX/PRO/DX INJ NEW DRUG ADDON: CPT

## 2025-04-13 PROCEDURE — 2550000001 HC RX 255 CONTRASTS: Performed by: NURSE PRACTITIONER

## 2025-04-13 PROCEDURE — 83605 ASSAY OF LACTIC ACID: CPT | Performed by: NURSE PRACTITIONER

## 2025-04-13 PROCEDURE — 71260 CT THORAX DX C+: CPT | Performed by: STUDENT IN AN ORGANIZED HEALTH CARE EDUCATION/TRAINING PROGRAM

## 2025-04-13 PROCEDURE — 84132 ASSAY OF SERUM POTASSIUM: CPT | Performed by: NURSE PRACTITIONER

## 2025-04-13 PROCEDURE — 99222 1ST HOSP IP/OBS MODERATE 55: CPT

## 2025-04-13 PROCEDURE — 99285 EMERGENCY DEPT VISIT HI MDM: CPT | Mod: 25 | Performed by: STUDENT IN AN ORGANIZED HEALTH CARE EDUCATION/TRAINING PROGRAM

## 2025-04-13 PROCEDURE — 71260 CT THORAX DX C+: CPT

## 2025-04-13 PROCEDURE — 72131 CT LUMBAR SPINE W/O DYE: CPT

## 2025-04-13 PROCEDURE — 96376 TX/PRO/DX INJ SAME DRUG ADON: CPT

## 2025-04-13 PROCEDURE — 85025 COMPLETE CBC W/AUTO DIFF WBC: CPT | Performed by: NURSE PRACTITIONER

## 2025-04-13 PROCEDURE — 2500000004 HC RX 250 GENERAL PHARMACY W/ HCPCS (ALT 636 FOR OP/ED)

## 2025-04-13 RX ORDER — MORPHINE SULFATE 2 MG/ML
2 INJECTION, SOLUTION INTRAMUSCULAR; INTRAVENOUS EVERY 4 HOURS PRN
Status: DISCONTINUED | OUTPATIENT
Start: 2025-04-13 | End: 2025-04-14

## 2025-04-13 RX ORDER — OXYCODONE HYDROCHLORIDE 5 MG/1
5 TABLET ORAL EVERY 6 HOURS PRN
Status: DISCONTINUED | OUTPATIENT
Start: 2025-04-13 | End: 2025-04-13

## 2025-04-13 RX ORDER — TERIPARATIDE 250 UG/ML
20 INJECTION, SOLUTION SUBCUTANEOUS DAILY
Status: DISCONTINUED | OUTPATIENT
Start: 2025-04-14 | End: 2025-04-15

## 2025-04-13 RX ORDER — MORPHINE SULFATE 4 MG/ML
4 INJECTION INTRAVENOUS ONCE
Status: COMPLETED | OUTPATIENT
Start: 2025-04-13 | End: 2025-04-13

## 2025-04-13 RX ORDER — ENOXAPARIN SODIUM 100 MG/ML
40 INJECTION SUBCUTANEOUS EVERY 24 HOURS
Status: DISCONTINUED | OUTPATIENT
Start: 2025-04-13 | End: 2025-04-15 | Stop reason: HOSPADM

## 2025-04-13 RX ORDER — BUPROPION HYDROCHLORIDE 150 MG/1
300 TABLET ORAL DAILY
Status: DISCONTINUED | OUTPATIENT
Start: 2025-04-14 | End: 2025-04-15 | Stop reason: HOSPADM

## 2025-04-13 RX ORDER — MORPHINE SULFATE 4 MG/ML
4 INJECTION INTRAVENOUS EVERY 4 HOURS PRN
Status: DISCONTINUED | OUTPATIENT
Start: 2025-04-13 | End: 2025-04-14

## 2025-04-13 RX ORDER — CHOLECALCIFEROL (VITAMIN D3) 25 MCG
25 TABLET ORAL 2 TIMES DAILY
Status: DISCONTINUED | OUTPATIENT
Start: 2025-04-13 | End: 2025-04-15 | Stop reason: HOSPADM

## 2025-04-13 RX ORDER — OXYCODONE HYDROCHLORIDE 5 MG/1
10 TABLET ORAL EVERY 6 HOURS PRN
Status: DISCONTINUED | OUTPATIENT
Start: 2025-04-13 | End: 2025-04-13

## 2025-04-13 RX ORDER — ONDANSETRON HYDROCHLORIDE 2 MG/ML
4 INJECTION, SOLUTION INTRAVENOUS ONCE
Status: COMPLETED | OUTPATIENT
Start: 2025-04-13 | End: 2025-04-13

## 2025-04-13 RX ORDER — LANOLIN ALCOHOL/MO/W.PET/CERES
200 CREAM (GRAM) TOPICAL DAILY
Status: DISCONTINUED | OUTPATIENT
Start: 2025-04-14 | End: 2025-04-15 | Stop reason: HOSPADM

## 2025-04-13 RX ORDER — KETOROLAC TROMETHAMINE 15 MG/ML
15 INJECTION, SOLUTION INTRAMUSCULAR; INTRAVENOUS EVERY 6 HOURS PRN
Status: DISCONTINUED | OUTPATIENT
Start: 2025-04-14 | End: 2025-04-15 | Stop reason: HOSPADM

## 2025-04-13 RX ORDER — KETOROLAC TROMETHAMINE 15 MG/ML
15 INJECTION, SOLUTION INTRAMUSCULAR; INTRAVENOUS ONCE
Status: COMPLETED | OUTPATIENT
Start: 2025-04-13 | End: 2025-04-13

## 2025-04-13 RX ORDER — ONDANSETRON 4 MG/1
4 TABLET, ORALLY DISINTEGRATING ORAL EVERY 8 HOURS PRN
Status: DISCONTINUED | OUTPATIENT
Start: 2025-04-13 | End: 2025-04-15 | Stop reason: HOSPADM

## 2025-04-13 RX ORDER — MORPHINE SULFATE 4 MG/ML
4 INJECTION INTRAVENOUS ONCE
Status: DISCONTINUED | OUTPATIENT
Start: 2025-04-13 | End: 2025-04-14

## 2025-04-13 RX ORDER — LIDOCAINE 560 MG/1
1 PATCH PERCUTANEOUS; TOPICAL; TRANSDERMAL DAILY
Status: DISCONTINUED | OUTPATIENT
Start: 2025-04-13 | End: 2025-04-15 | Stop reason: HOSPADM

## 2025-04-13 RX ORDER — IPRATROPIUM BROMIDE AND ALBUTEROL SULFATE 2.5; .5 MG/3ML; MG/3ML
3 SOLUTION RESPIRATORY (INHALATION) EVERY 2 HOUR PRN
Status: DISCONTINUED | OUTPATIENT
Start: 2025-04-13 | End: 2025-04-15 | Stop reason: HOSPADM

## 2025-04-13 RX ORDER — CITALOPRAM 20 MG/1
40 TABLET, FILM COATED ORAL DAILY
Status: DISCONTINUED | OUTPATIENT
Start: 2025-04-14 | End: 2025-04-15 | Stop reason: HOSPADM

## 2025-04-13 RX ORDER — POLYETHYLENE GLYCOL 3350 17 G/17G
17 POWDER, FOR SOLUTION ORAL DAILY
Status: DISCONTINUED | OUTPATIENT
Start: 2025-04-14 | End: 2025-04-15 | Stop reason: HOSPADM

## 2025-04-13 RX ORDER — ACETAMINOPHEN 325 MG/1
650 TABLET ORAL EVERY 6 HOURS PRN
Status: DISCONTINUED | OUTPATIENT
Start: 2025-04-13 | End: 2025-04-14

## 2025-04-13 RX ORDER — ONDANSETRON HYDROCHLORIDE 2 MG/ML
4 INJECTION, SOLUTION INTRAVENOUS EVERY 8 HOURS PRN
Status: DISCONTINUED | OUTPATIENT
Start: 2025-04-13 | End: 2025-04-15 | Stop reason: HOSPADM

## 2025-04-13 RX ORDER — TRAZODONE HYDROCHLORIDE 50 MG/1
100 TABLET ORAL NIGHTLY PRN
Status: DISCONTINUED | OUTPATIENT
Start: 2025-04-13 | End: 2025-04-15 | Stop reason: HOSPADM

## 2025-04-13 RX ORDER — GABAPENTIN 100 MG/1
100 CAPSULE ORAL 2 TIMES DAILY
Status: DISCONTINUED | OUTPATIENT
Start: 2025-04-13 | End: 2025-04-15 | Stop reason: HOSPADM

## 2025-04-13 RX ORDER — ACETAMINOPHEN 500 MG
5 TABLET ORAL NIGHTLY PRN
Status: DISCONTINUED | OUTPATIENT
Start: 2025-04-13 | End: 2025-04-15 | Stop reason: HOSPADM

## 2025-04-13 RX ADMIN — SODIUM CHLORIDE, SODIUM LACTATE, POTASSIUM CHLORIDE, AND CALCIUM CHLORIDE 1000 ML: .6; .31; .03; .02 INJECTION, SOLUTION INTRAVENOUS at 22:26

## 2025-04-13 RX ADMIN — ONDANSETRON 4 MG: 2 INJECTION INTRAMUSCULAR; INTRAVENOUS at 16:35

## 2025-04-13 RX ADMIN — MORPHINE SULFATE 4 MG: 4 INJECTION INTRAVENOUS at 17:30

## 2025-04-13 RX ADMIN — GABAPENTIN 100 MG: 100 CAPSULE ORAL at 22:27

## 2025-04-13 RX ADMIN — MORPHINE SULFATE 4 MG: 4 INJECTION INTRAVENOUS at 16:38

## 2025-04-13 RX ADMIN — MORPHINE SULFATE 4 MG: 4 INJECTION INTRAVENOUS at 20:02

## 2025-04-13 RX ADMIN — KETOROLAC TROMETHAMINE 15 MG: 15 INJECTION, SOLUTION INTRAMUSCULAR; INTRAVENOUS at 18:16

## 2025-04-13 RX ADMIN — MORPHINE SULFATE 2 MG: 2 INJECTION, SOLUTION INTRAMUSCULAR; INTRAVENOUS at 22:36

## 2025-04-13 RX ADMIN — TRAZODONE HYDROCHLORIDE 100 MG: 50 TABLET ORAL at 22:36

## 2025-04-13 RX ADMIN — ENOXAPARIN SODIUM 40 MG: 40 INJECTION SUBCUTANEOUS at 22:26

## 2025-04-13 RX ADMIN — IOHEXOL 50 ML: 350 INJECTION, SOLUTION INTRAVENOUS at 18:09

## 2025-04-13 SDOH — ECONOMIC STABILITY: INCOME INSECURITY: IN THE PAST 12 MONTHS HAS THE ELECTRIC, GAS, OIL, OR WATER COMPANY THREATENED TO SHUT OFF SERVICES IN YOUR HOME?: NO

## 2025-04-13 SDOH — ECONOMIC STABILITY: FOOD INSECURITY: WITHIN THE PAST 12 MONTHS, THE FOOD YOU BOUGHT JUST DIDN'T LAST AND YOU DIDN'T HAVE MONEY TO GET MORE.: NEVER TRUE

## 2025-04-13 SDOH — SOCIAL STABILITY: SOCIAL INSECURITY: WITHIN THE LAST YEAR, HAVE YOU BEEN HUMILIATED OR EMOTIONALLY ABUSED IN OTHER WAYS BY YOUR PARTNER OR EX-PARTNER?: NO

## 2025-04-13 SDOH — ECONOMIC STABILITY: FOOD INSECURITY
WITHIN THE PAST 12 MONTHS, YOU WORRIED THAT YOUR FOOD WOULD RUN OUT BEFORE YOU GOT THE MONEY TO BUY MORE.: SOMETIMES TRUE

## 2025-04-13 SDOH — SOCIAL STABILITY: SOCIAL INSECURITY: WITHIN THE LAST YEAR, HAVE YOU BEEN AFRAID OF YOUR PARTNER OR EX-PARTNER?: NO

## 2025-04-13 SDOH — SOCIAL STABILITY: SOCIAL INSECURITY: HAVE YOU HAD THOUGHTS OF HARMING ANYONE ELSE?: NO

## 2025-04-13 SDOH — SOCIAL STABILITY: SOCIAL INSECURITY: HAVE YOU HAD ANY THOUGHTS OF HARMING ANYONE ELSE?: NO

## 2025-04-13 SDOH — SOCIAL STABILITY: SOCIAL INSECURITY
WITHIN THE LAST YEAR, HAVE YOU BEEN RAPED OR FORCED TO HAVE ANY KIND OF SEXUAL ACTIVITY BY YOUR PARTNER OR EX-PARTNER?: NO

## 2025-04-13 SDOH — SOCIAL STABILITY: SOCIAL INSECURITY: ABUSE: ADULT

## 2025-04-13 SDOH — SOCIAL STABILITY: SOCIAL INSECURITY: WERE YOU ABLE TO COMPLETE ALL THE BEHAVIORAL HEALTH SCREENINGS?: YES

## 2025-04-13 SDOH — SOCIAL STABILITY: SOCIAL INSECURITY
WITHIN THE LAST YEAR, HAVE YOU BEEN KICKED, HIT, SLAPPED, OR OTHERWISE PHYSICALLY HURT BY YOUR PARTNER OR EX-PARTNER?: NO

## 2025-04-13 SDOH — SOCIAL STABILITY: SOCIAL INSECURITY: ARE YOU OR HAVE YOU BEEN THREATENED OR ABUSED PHYSICALLY, EMOTIONALLY, OR SEXUALLY BY ANYONE?: NO

## 2025-04-13 SDOH — SOCIAL STABILITY: SOCIAL INSECURITY: HAS ANYONE EVER THREATENED TO HURT YOUR FAMILY OR YOUR PETS?: NO

## 2025-04-13 SDOH — SOCIAL STABILITY: SOCIAL INSECURITY: DO YOU FEEL UNSAFE GOING BACK TO THE PLACE WHERE YOU ARE LIVING?: NO

## 2025-04-13 SDOH — SOCIAL STABILITY: SOCIAL INSECURITY: ARE THERE ANY APPARENT SIGNS OF INJURIES/BEHAVIORS THAT COULD BE RELATED TO ABUSE/NEGLECT?: NO

## 2025-04-13 SDOH — SOCIAL STABILITY: SOCIAL INSECURITY: DOES ANYONE TRY TO KEEP YOU FROM HAVING/CONTACTING OTHER FRIENDS OR DOING THINGS OUTSIDE YOUR HOME?: NO

## 2025-04-13 SDOH — SOCIAL STABILITY: SOCIAL INSECURITY: DO YOU FEEL ANYONE HAS EXPLOITED OR TAKEN ADVANTAGE OF YOU FINANCIALLY OR OF YOUR PERSONAL PROPERTY?: NO

## 2025-04-13 ASSESSMENT — PATIENT HEALTH QUESTIONNAIRE - PHQ9
SUM OF ALL RESPONSES TO PHQ9 QUESTIONS 1 & 2: 1
2. FEELING DOWN, DEPRESSED OR HOPELESS: SEVERAL DAYS
1. LITTLE INTEREST OR PLEASURE IN DOING THINGS: NOT AT ALL

## 2025-04-13 ASSESSMENT — COGNITIVE AND FUNCTIONAL STATUS - GENERAL
DAILY ACTIVITIY SCORE: 20
CLIMB 3 TO 5 STEPS WITH RAILING: A LITTLE
DRESSING REGULAR UPPER BODY CLOTHING: A LITTLE
PATIENT BASELINE BEDBOUND: NO
STANDING UP FROM CHAIR USING ARMS: A LITTLE
MOBILITY SCORE: 19
MOVING TO AND FROM BED TO CHAIR: A LITTLE
DRESSING REGULAR LOWER BODY CLOTHING: A LITTLE
TOILETING: A LITTLE
TURNING FROM BACK TO SIDE WHILE IN FLAT BAD: A LITTLE
HELP NEEDED FOR BATHING: A LITTLE
WALKING IN HOSPITAL ROOM: A LITTLE

## 2025-04-13 ASSESSMENT — LIFESTYLE VARIABLES
AUDIT-C TOTAL SCORE: 0
TOTAL SCORE: 0
EVER HAD A DRINK FIRST THING IN THE MORNING TO STEADY YOUR NERVES TO GET RID OF A HANGOVER: NO
SKIP TO QUESTIONS 9-10: 1
HOW OFTEN DO YOU HAVE A DRINK CONTAINING ALCOHOL: NEVER
AUDIT-C TOTAL SCORE: 0
HOW MANY STANDARD DRINKS CONTAINING ALCOHOL DO YOU HAVE ON A TYPICAL DAY: PATIENT DOES NOT DRINK
HOW OFTEN DO YOU HAVE 6 OR MORE DRINKS ON ONE OCCASION: NEVER
HAVE YOU EVER FELT YOU SHOULD CUT DOWN ON YOUR DRINKING: NO
EVER FELT BAD OR GUILTY ABOUT YOUR DRINKING: NO
HAVE PEOPLE ANNOYED YOU BY CRITICIZING YOUR DRINKING: NO

## 2025-04-13 ASSESSMENT — ACTIVITIES OF DAILY LIVING (ADL)
PATIENT'S MEMORY ADEQUATE TO SAFELY COMPLETE DAILY ACTIVITIES?: YES
GROOMING: INDEPENDENT
WALKS IN HOME: INDEPENDENT
HEARING - LEFT EAR: DIFFICULTY WITH NOISE
LACK_OF_TRANSPORTATION: NO
ASSISTIVE_DEVICE: EYEGLASSES
BATHING: INDEPENDENT
HEARING - RIGHT EAR: DIFFICULTY WITH NOISE
ADEQUATE_TO_COMPLETE_ADL: YES
DRESSING YOURSELF: INDEPENDENT
TOILETING: INDEPENDENT
FEEDING YOURSELF: INDEPENDENT
JUDGMENT_ADEQUATE_SAFELY_COMPLETE_DAILY_ACTIVITIES: YES

## 2025-04-13 ASSESSMENT — COLUMBIA-SUICIDE SEVERITY RATING SCALE - C-SSRS
6. HAVE YOU EVER DONE ANYTHING, STARTED TO DO ANYTHING, OR PREPARED TO DO ANYTHING TO END YOUR LIFE?: NO
2. HAVE YOU ACTUALLY HAD ANY THOUGHTS OF KILLING YOURSELF?: NO
6. HAVE YOU EVER DONE ANYTHING, STARTED TO DO ANYTHING, OR PREPARED TO DO ANYTHING TO END YOUR LIFE?: NO
2. HAVE YOU ACTUALLY HAD ANY THOUGHTS OF KILLING YOURSELF?: NO
1. IN THE PAST MONTH, HAVE YOU WISHED YOU WERE DEAD OR WISHED YOU COULD GO TO SLEEP AND NOT WAKE UP?: NO
1. IN THE PAST MONTH, HAVE YOU WISHED YOU WERE DEAD OR WISHED YOU COULD GO TO SLEEP AND NOT WAKE UP?: NO

## 2025-04-13 ASSESSMENT — PAIN DESCRIPTION - DESCRIPTORS: DESCRIPTORS: ACHING

## 2025-04-13 ASSESSMENT — PAIN DESCRIPTION - LOCATION: LOCATION: CHEST

## 2025-04-13 ASSESSMENT — PAIN - FUNCTIONAL ASSESSMENT
PAIN_FUNCTIONAL_ASSESSMENT: 0-10

## 2025-04-13 ASSESSMENT — PAIN SCALES - GENERAL
PAINLEVEL_OUTOF10: 7
PAINLEVEL_OUTOF10: 10 - WORST POSSIBLE PAIN
PAINLEVEL_OUTOF10: 10 - WORST POSSIBLE PAIN

## 2025-04-13 ASSESSMENT — PAIN DESCRIPTION - PROGRESSION: CLINICAL_PROGRESSION: NOT CHANGED
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
## 2025-04-14 PROBLEM — R07.89 CHEST WALL PAIN: Status: ACTIVE | Noted: 2025-04-14

## 2025-04-14 LAB
ALBUMIN SERPL BCP-MCNC: 3.4 G/DL (ref 3.4–5)
ANION GAP SERPL CALC-SCNC: 8 MMOL/L (ref 10–20)
BASOPHILS # BLD AUTO: 0.02 X10*3/UL (ref 0–0.1)
BASOPHILS NFR BLD AUTO: 0.3 %
BUN SERPL-MCNC: 11 MG/DL (ref 6–23)
CALCIUM SERPL-MCNC: 8.2 MG/DL (ref 8.6–10.3)
CHLORIDE SERPL-SCNC: 104 MMOL/L (ref 98–107)
CO2 SERPL-SCNC: 31 MMOL/L (ref 21–32)
CREAT SERPL-MCNC: 0.67 MG/DL (ref 0.5–1.05)
EGFRCR SERPLBLD CKD-EPI 2021: >90 ML/MIN/1.73M*2
EOSINOPHIL # BLD AUTO: 0.08 X10*3/UL (ref 0–0.7)
EOSINOPHIL NFR BLD AUTO: 1.2 %
ERYTHROCYTE [DISTWIDTH] IN BLOOD BY AUTOMATED COUNT: 11.9 % (ref 11.5–14.5)
GLUCOSE SERPL-MCNC: 107 MG/DL (ref 74–99)
HCT VFR BLD AUTO: 35.2 % (ref 36–46)
HGB BLD-MCNC: 11.9 G/DL (ref 12–16)
IMM GRANULOCYTES # BLD AUTO: 0.02 X10*3/UL (ref 0–0.7)
IMM GRANULOCYTES NFR BLD AUTO: 0.3 % (ref 0–0.9)
LYMPHOCYTES # BLD AUTO: 1.28 X10*3/UL (ref 1.2–4.8)
LYMPHOCYTES NFR BLD AUTO: 18.4 %
MAGNESIUM SERPL-MCNC: 1.57 MG/DL (ref 1.6–2.4)
MCH RBC QN AUTO: 32.9 PG (ref 26–34)
MCHC RBC AUTO-ENTMCNC: 33.8 G/DL (ref 32–36)
MCV RBC AUTO: 97 FL (ref 80–100)
MONOCYTES # BLD AUTO: 0.64 X10*3/UL (ref 0.1–1)
MONOCYTES NFR BLD AUTO: 9.2 %
NEUTROPHILS # BLD AUTO: 4.91 X10*3/UL (ref 1.2–7.7)
NEUTROPHILS NFR BLD AUTO: 70.6 %
NRBC BLD-RTO: 0 /100 WBCS (ref 0–0)
PHOSPHATE SERPL-MCNC: 2.9 MG/DL (ref 2.5–4.9)
PLATELET # BLD AUTO: 124 X10*3/UL (ref 150–450)
POTASSIUM SERPL-SCNC: 3.8 MMOL/L (ref 3.5–5.3)
RBC # BLD AUTO: 3.62 X10*6/UL (ref 4–5.2)
SODIUM SERPL-SCNC: 139 MMOL/L (ref 136–145)
WBC # BLD AUTO: 7 X10*3/UL (ref 4.4–11.3)

## 2025-04-14 PROCEDURE — 2500000001 HC RX 250 WO HCPCS SELF ADMINISTERED DRUGS (ALT 637 FOR MEDICARE OP)

## 2025-04-14 PROCEDURE — 94760 N-INVAS EAR/PLS OXIMETRY 1: CPT

## 2025-04-14 PROCEDURE — 2500000004 HC RX 250 GENERAL PHARMACY W/ HCPCS (ALT 636 FOR OP/ED)

## 2025-04-14 PROCEDURE — 1100000001 HC PRIVATE ROOM DAILY

## 2025-04-14 PROCEDURE — 99232 SBSQ HOSP IP/OBS MODERATE 35: CPT | Performed by: NURSE PRACTITIONER

## 2025-04-14 PROCEDURE — 2500000005 HC RX 250 GENERAL PHARMACY W/O HCPCS: Performed by: INTERNAL MEDICINE

## 2025-04-14 PROCEDURE — 36415 COLL VENOUS BLD VENIPUNCTURE: CPT

## 2025-04-14 PROCEDURE — 80069 RENAL FUNCTION PANEL: CPT

## 2025-04-14 PROCEDURE — 83735 ASSAY OF MAGNESIUM: CPT

## 2025-04-14 PROCEDURE — 85025 COMPLETE CBC W/AUTO DIFF WBC: CPT

## 2025-04-14 PROCEDURE — 97161 PT EVAL LOW COMPLEX 20 MIN: CPT | Mod: GP

## 2025-04-14 RX ORDER — METHOCARBAMOL 500 MG/1
500 TABLET, FILM COATED ORAL EVERY 8 HOURS SCHEDULED
Status: DISCONTINUED | OUTPATIENT
Start: 2025-04-14 | End: 2025-04-15 | Stop reason: HOSPADM

## 2025-04-14 RX ORDER — MORPHINE SULFATE 15 MG/1
15 TABLET ORAL EVERY 4 HOURS PRN
Status: DISCONTINUED | OUTPATIENT
Start: 2025-04-14 | End: 2025-04-15 | Stop reason: HOSPADM

## 2025-04-14 RX ORDER — ACETAMINOPHEN 325 MG/1
650 TABLET ORAL EVERY 6 HOURS
Status: DISCONTINUED | OUTPATIENT
Start: 2025-04-14 | End: 2025-04-15 | Stop reason: HOSPADM

## 2025-04-14 RX ORDER — MAGNESIUM SULFATE HEPTAHYDRATE 40 MG/ML
2 INJECTION, SOLUTION INTRAVENOUS ONCE
Status: COMPLETED | OUTPATIENT
Start: 2025-04-14 | End: 2025-04-14

## 2025-04-14 RX ORDER — MORPHINE SULFATE 15 MG/1
7.5 TABLET ORAL EVERY 4 HOURS PRN
Status: DISCONTINUED | OUTPATIENT
Start: 2025-04-14 | End: 2025-04-15 | Stop reason: HOSPADM

## 2025-04-14 RX ADMIN — Medication 25 MCG: at 20:24

## 2025-04-14 RX ADMIN — ENOXAPARIN SODIUM 40 MG: 40 INJECTION SUBCUTANEOUS at 20:26

## 2025-04-14 RX ADMIN — ACETAMINOPHEN 650 MG: 325 TABLET ORAL at 13:55

## 2025-04-14 RX ADMIN — MORPHINE SULFATE 15 MG: 15 TABLET ORAL at 23:22

## 2025-04-14 RX ADMIN — TRAZODONE HYDROCHLORIDE 100 MG: 50 TABLET ORAL at 23:22

## 2025-04-14 RX ADMIN — GABAPENTIN 100 MG: 100 CAPSULE ORAL at 20:24

## 2025-04-14 RX ADMIN — CITALOPRAM HYDROBROMIDE 40 MG: 20 TABLET ORAL at 08:15

## 2025-04-14 RX ADMIN — ACETAMINOPHEN 650 MG: 325 TABLET ORAL at 20:24

## 2025-04-14 RX ADMIN — BUPROPION HYDROCHLORIDE 300 MG: 150 TABLET, EXTENDED RELEASE ORAL at 08:15

## 2025-04-14 RX ADMIN — METHOCARBAMOL 500 MG: 500 TABLET ORAL at 13:55

## 2025-04-14 RX ADMIN — Medication 3 L/MIN: at 07:41

## 2025-04-14 RX ADMIN — MORPHINE SULFATE 15 MG: 15 TABLET ORAL at 18:42

## 2025-04-14 RX ADMIN — GABAPENTIN 100 MG: 100 CAPSULE ORAL at 08:15

## 2025-04-14 RX ADMIN — METHOCARBAMOL 500 MG: 500 TABLET ORAL at 20:25

## 2025-04-14 RX ADMIN — Medication 200 MG: at 08:15

## 2025-04-14 RX ADMIN — KETOROLAC TROMETHAMINE 15 MG: 15 INJECTION, SOLUTION INTRAMUSCULAR; INTRAVENOUS at 06:04

## 2025-04-14 RX ADMIN — Medication 25 MCG: at 08:15

## 2025-04-14 RX ADMIN — MAGNESIUM SULFATE HEPTAHYDRATE 2 G: 40 INJECTION, SOLUTION INTRAVENOUS at 09:51

## 2025-04-14 RX ADMIN — MORPHINE SULFATE 4 MG: 4 INJECTION INTRAVENOUS at 08:25

## 2025-04-14 ASSESSMENT — COGNITIVE AND FUNCTIONAL STATUS - GENERAL
DAILY ACTIVITIY SCORE: 20
TURNING FROM BACK TO SIDE WHILE IN FLAT BAD: A LITTLE
MOBILITY SCORE: 19
TOILETING: A LITTLE
STANDING UP FROM CHAIR USING ARMS: A LITTLE
WALKING IN HOSPITAL ROOM: A LITTLE
HELP NEEDED FOR BATHING: A LITTLE
DRESSING REGULAR LOWER BODY CLOTHING: A LITTLE
WALKING IN HOSPITAL ROOM: A LITTLE
TURNING FROM BACK TO SIDE WHILE IN FLAT BAD: A LITTLE
TURNING FROM BACK TO SIDE WHILE IN FLAT BAD: A LITTLE
CLIMB 3 TO 5 STEPS WITH RAILING: A LITTLE
DRESSING REGULAR UPPER BODY CLOTHING: A LITTLE
DAILY ACTIVITIY SCORE: 20
MOBILITY SCORE: 18
STANDING UP FROM CHAIR USING ARMS: A LITTLE
MOVING TO AND FROM BED TO CHAIR: A LITTLE
CLIMB 3 TO 5 STEPS WITH RAILING: A LITTLE
DRESSING REGULAR UPPER BODY CLOTHING: A LITTLE
MOVING TO AND FROM BED TO CHAIR: A LITTLE
CLIMB 3 TO 5 STEPS WITH RAILING: A LITTLE
DRESSING REGULAR LOWER BODY CLOTHING: A LITTLE
WALKING IN HOSPITAL ROOM: A LITTLE
MOVING TO AND FROM BED TO CHAIR: A LITTLE
MOBILITY SCORE: 19
STANDING UP FROM CHAIR USING ARMS: A LITTLE
MOVING FROM LYING ON BACK TO SITTING ON SIDE OF FLAT BED WITH BEDRAILS: A LITTLE
HELP NEEDED FOR BATHING: A LITTLE
TOILETING: A LITTLE

## 2025-04-14 ASSESSMENT — PAIN DESCRIPTION - LOCATION
LOCATION: STERNUM
LOCATION: BACK
LOCATION: STERNUM

## 2025-04-14 ASSESSMENT — PAIN - FUNCTIONAL ASSESSMENT
PAIN_FUNCTIONAL_ASSESSMENT: 0-10

## 2025-04-14 ASSESSMENT — PAIN SCALES - GENERAL
PAINLEVEL_OUTOF10: 10 - WORST POSSIBLE PAIN
PAINLEVEL_OUTOF10: 8
PAINLEVEL_OUTOF10: 3
PAINLEVEL_OUTOF10: 10 - WORST POSSIBLE PAIN
PAINLEVEL_OUTOF10: 6
PAINLEVEL_OUTOF10: 8

## 2025-04-14 ASSESSMENT — PAIN DESCRIPTION - DESCRIPTORS
DESCRIPTORS: SHARP
DESCRIPTORS: ACHING
DESCRIPTORS: ACHING

## 2025-04-14 ASSESSMENT — ACTIVITIES OF DAILY LIVING (ADL)
LACK_OF_TRANSPORTATION: NO
ADL_ASSISTANCE: INDEPENDENT

## 2025-04-14 ASSESSMENT — PAIN DESCRIPTION - ORIENTATION: ORIENTATION: LOWER
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
## 2025-04-14 NOTE — NURSING NOTE
Patient arrived to the floor from ER.  Patient has been oriented to chris room and is A&Ox3. Call light in reach. Patient states pain at 10/10.
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
## 2025-04-14 NOTE — PROGRESS NOTES
04/14/25 1417   Discharge Planning   Living Arrangements Spouse/significant other   Support Systems Spouse/significant other   Assistance Needed Alert and oriented x 3, Independent with ADL's, Drives, Grab bars in shower, Room air at baseline, 2 liters O2 being utilized here.   Type of Residence Private residence  (Mobile Home [place] park)   Number of Stairs to Enter Residence 5   Number of Stairs Within Residence 0   Do you have animals or pets at home? No   Who is requesting discharge planning? Provider   Home or Post Acute Services Other (Comment)  (TBD, PT/OT evaluations are pending at this time)   Expected Discharge Disposition Home  (TBD if patient will have any needs at time of discharge, awaiting PT/OT evaluation recommendations.)   Does the patient need discharge transport arranged? No   Financial Resource Strain   How hard is it for you to pay for the very basics like food, housing, medical care, and heating? Somewhat   Housing Stability   In the last 12 months, was there a time when you were not able to pay the mortgage or rent on time? N   In the past 12 months, how many times have you moved where you were living? 0   At any time in the past 12 months, were you homeless or living in a shelter (including now)? N   Transportation Needs   In the past 12 months, has lack of transportation kept you from medical appointments or from getting medications? no   In the past 12 months, has lack of transportation kept you from meetings, work, or from getting things needed for daily living? No   Patient Choice   Provider Choice list and CMS website (https://medicare.gov/care-compare#search) for post-acute Quality and Resource Measure Data were provided and reviewed with: Patient   Patient / Family choosing to utilize agency / facility established prior to hospitalization No   Stroke Family Assessment   Stroke Family Assessment Needed No   Intensity of Service   Intensity of Service 0-30 min
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
## 2025-04-14 NOTE — CARE PLAN
The patient's goals for the shift include  remain comfortable    The clinical goals for the shift include pt will be weaned from oxygen during shift    Problem: Pain  Goal: Takes deep breaths with improved pain control throughout the shift  Outcome: Progressing     Problem: Pain  Goal: Walks with improved pain control throughout the shift  Outcome: Progressing     Problem: Pain  Goal: Performs ADL's with improved pain control throughout shift  Outcome: Progressing

## 2025-04-15 ENCOUNTER — PHARMACY VISIT (OUTPATIENT)
Dept: PHARMACY | Facility: CLINIC | Age: 70
End: 2025-04-15
Payer: COMMERCIAL

## 2025-04-15 VITALS
BODY MASS INDEX: 24.37 KG/M2 | HEIGHT: 59 IN | HEART RATE: 64 BPM | WEIGHT: 120.9 LBS | TEMPERATURE: 98.1 F | OXYGEN SATURATION: 87 % | DIASTOLIC BLOOD PRESSURE: 79 MMHG | SYSTOLIC BLOOD PRESSURE: 107 MMHG | RESPIRATION RATE: 20 BRPM

## 2025-04-15 LAB
ALBUMIN SERPL BCP-MCNC: 3.3 G/DL (ref 3.4–5)
ANION GAP SERPL CALC-SCNC: 10 MMOL/L (ref 10–20)
BASOPHILS # BLD AUTO: 0.04 X10*3/UL (ref 0–0.1)
BASOPHILS NFR BLD AUTO: 0.6 %
BUN SERPL-MCNC: 13 MG/DL (ref 6–23)
CALCIUM SERPL-MCNC: 8.2 MG/DL (ref 8.6–10.3)
CHLORIDE SERPL-SCNC: 104 MMOL/L (ref 98–107)
CO2 SERPL-SCNC: 30 MMOL/L (ref 21–32)
CREAT SERPL-MCNC: 0.62 MG/DL (ref 0.5–1.05)
EGFRCR SERPLBLD CKD-EPI 2021: >90 ML/MIN/1.73M*2
EOSINOPHIL # BLD AUTO: 0.3 X10*3/UL (ref 0–0.7)
EOSINOPHIL NFR BLD AUTO: 4.5 %
ERYTHROCYTE [DISTWIDTH] IN BLOOD BY AUTOMATED COUNT: 11.8 % (ref 11.5–14.5)
GLUCOSE SERPL-MCNC: 96 MG/DL (ref 74–99)
HCT VFR BLD AUTO: 34.8 % (ref 36–46)
HGB BLD-MCNC: 11.6 G/DL (ref 12–16)
IMM GRANULOCYTES # BLD AUTO: 0.02 X10*3/UL (ref 0–0.7)
IMM GRANULOCYTES NFR BLD AUTO: 0.3 % (ref 0–0.9)
LYMPHOCYTES # BLD AUTO: 1.76 X10*3/UL (ref 1.2–4.8)
LYMPHOCYTES NFR BLD AUTO: 26.5 %
MAGNESIUM SERPL-MCNC: 1.67 MG/DL (ref 1.6–2.4)
MCH RBC QN AUTO: 32.8 PG (ref 26–34)
MCHC RBC AUTO-ENTMCNC: 33.3 G/DL (ref 32–36)
MCV RBC AUTO: 98 FL (ref 80–100)
MONOCYTES # BLD AUTO: 0.7 X10*3/UL (ref 0.1–1)
MONOCYTES NFR BLD AUTO: 10.5 %
NEUTROPHILS # BLD AUTO: 3.82 X10*3/UL (ref 1.2–7.7)
NEUTROPHILS NFR BLD AUTO: 57.6 %
NRBC BLD-RTO: 0 /100 WBCS (ref 0–0)
OVALOCYTES BLD QL SMEAR: NORMAL
PHOSPHATE SERPL-MCNC: 3.1 MG/DL (ref 2.5–4.9)
PLATELET # BLD AUTO: 106 X10*3/UL (ref 150–450)
POTASSIUM SERPL-SCNC: 3.9 MMOL/L (ref 3.5–5.3)
RBC # BLD AUTO: 3.54 X10*6/UL (ref 4–5.2)
RBC MORPH BLD: NORMAL
SCHISTOCYTES BLD QL SMEAR: NORMAL
SODIUM SERPL-SCNC: 140 MMOL/L (ref 136–145)
WBC # BLD AUTO: 6.6 X10*3/UL (ref 4.4–11.3)

## 2025-04-15 PROCEDURE — 2500000005 HC RX 250 GENERAL PHARMACY W/O HCPCS: Performed by: INTERNAL MEDICINE

## 2025-04-15 PROCEDURE — 99239 HOSP IP/OBS DSCHRG MGMT >30: CPT

## 2025-04-15 PROCEDURE — 2500000001 HC RX 250 WO HCPCS SELF ADMINISTERED DRUGS (ALT 637 FOR MEDICARE OP)

## 2025-04-15 PROCEDURE — 85025 COMPLETE CBC W/AUTO DIFF WBC: CPT

## 2025-04-15 PROCEDURE — 36415 COLL VENOUS BLD VENIPUNCTURE: CPT

## 2025-04-15 PROCEDURE — 94760 N-INVAS EAR/PLS OXIMETRY 1: CPT

## 2025-04-15 PROCEDURE — 97530 THERAPEUTIC ACTIVITIES: CPT | Mod: GO

## 2025-04-15 PROCEDURE — RXMED WILLOW AMBULATORY MEDICATION CHARGE

## 2025-04-15 PROCEDURE — 2500000004 HC RX 250 GENERAL PHARMACY W/ HCPCS (ALT 636 FOR OP/ED)

## 2025-04-15 PROCEDURE — 83735 ASSAY OF MAGNESIUM: CPT

## 2025-04-15 PROCEDURE — 80069 RENAL FUNCTION PANEL: CPT

## 2025-04-15 PROCEDURE — 94761 N-INVAS EAR/PLS OXIMETRY MLT: CPT

## 2025-04-15 PROCEDURE — 97165 OT EVAL LOW COMPLEX 30 MIN: CPT | Mod: GO

## 2025-04-15 RX ORDER — TERIPARATIDE 250 UG/ML
20 INJECTION, SOLUTION SUBCUTANEOUS DAILY
Status: DISCONTINUED | OUTPATIENT
Start: 2025-04-15 | End: 2025-04-15 | Stop reason: HOSPADM

## 2025-04-15 RX ORDER — LIDOCAINE 560 MG/1
1 PATCH PERCUTANEOUS; TOPICAL; TRANSDERMAL DAILY
Qty: 3 PATCH | Refills: 0 | Status: SHIPPED | OUTPATIENT
Start: 2025-04-16 | End: 2025-04-25 | Stop reason: WASHOUT

## 2025-04-15 RX ORDER — METHOCARBAMOL 500 MG/1
500 TABLET, FILM COATED ORAL EVERY 8 HOURS PRN
Qty: 30 TABLET | Refills: 0 | Status: SHIPPED | OUTPATIENT
Start: 2025-04-15 | End: 2025-04-25

## 2025-04-15 RX ORDER — TERIPARATIDE 250 UG/ML
20 INJECTION, SOLUTION SUBCUTANEOUS DAILY
Status: DISCONTINUED | OUTPATIENT
Start: 2025-04-16 | End: 2025-04-15

## 2025-04-15 RX ORDER — OXYCODONE HYDROCHLORIDE 5 MG/1
5 TABLET ORAL EVERY 6 HOURS PRN
Qty: 12 TABLET | Refills: 0 | Status: SHIPPED | OUTPATIENT
Start: 2025-04-15 | End: 2025-04-25 | Stop reason: WASHOUT

## 2025-04-15 RX ADMIN — Medication 25 MCG: at 08:48

## 2025-04-15 RX ADMIN — Medication 3 L/MIN: at 10:21

## 2025-04-15 RX ADMIN — BUPROPION HYDROCHLORIDE 300 MG: 150 TABLET, EXTENDED RELEASE ORAL at 08:48

## 2025-04-15 RX ADMIN — ACETAMINOPHEN 650 MG: 325 TABLET ORAL at 08:48

## 2025-04-15 RX ADMIN — Medication 200 MG: at 08:47

## 2025-04-15 RX ADMIN — METHOCARBAMOL 500 MG: 500 TABLET ORAL at 08:48

## 2025-04-15 RX ADMIN — POLYETHYLENE GLYCOL 3350 17 G: 17 POWDER, FOR SOLUTION ORAL at 08:49

## 2025-04-15 RX ADMIN — GABAPENTIN 100 MG: 100 CAPSULE ORAL at 08:48

## 2025-04-15 RX ADMIN — CITALOPRAM HYDROBROMIDE 40 MG: 20 TABLET ORAL at 08:48

## 2025-04-15 ASSESSMENT — PAIN DESCRIPTION - LOCATION: LOCATION: STERNUM

## 2025-04-15 ASSESSMENT — ACTIVITIES OF DAILY LIVING (ADL)
ADL_ASSISTANCE: INDEPENDENT
BATHING_ASSISTANCE: STAND BY

## 2025-04-15 ASSESSMENT — COGNITIVE AND FUNCTIONAL STATUS - GENERAL
DRESSING REGULAR UPPER BODY CLOTHING: A LITTLE
TURNING FROM BACK TO SIDE WHILE IN FLAT BAD: A LITTLE
DRESSING REGULAR UPPER BODY CLOTHING: A LITTLE
DAILY ACTIVITIY SCORE: 21
CLIMB 3 TO 5 STEPS WITH RAILING: A LITTLE
DAILY ACTIVITIY SCORE: 20
TOILETING: A LITTLE
TOILETING: A LITTLE
HELP NEEDED FOR BATHING: A LITTLE
STANDING UP FROM CHAIR USING ARMS: A LITTLE
HELP NEEDED FOR BATHING: A LITTLE
MOBILITY SCORE: 19
DRESSING REGULAR LOWER BODY CLOTHING: A LITTLE
MOVING TO AND FROM BED TO CHAIR: A LITTLE
WALKING IN HOSPITAL ROOM: A LITTLE

## 2025-04-15 ASSESSMENT — PAIN DESCRIPTION - DESCRIPTORS: DESCRIPTORS: ACHING

## 2025-04-15 ASSESSMENT — PAIN SCALES - GENERAL
PAINLEVEL_OUTOF10: 10 - WORST POSSIBLE PAIN
PAINLEVEL_OUTOF10: 6
PAINLEVEL_OUTOF10: 8

## 2025-04-15 ASSESSMENT — PAIN - FUNCTIONAL ASSESSMENT
PAIN_FUNCTIONAL_ASSESSMENT: 0-10
PAIN_FUNCTIONAL_ASSESSMENT: 0-10
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
## 2025-04-15 NOTE — CARE PLAN
Patient was 94% on room air at rest. Pulse oximetry was 87% on room air during ambulation. Patient placed on 2 LPM and maintained 95% during ambulation.

## 2025-04-15 NOTE — NURSING NOTE
Discharge instructions reviewed with patient. No questions at this time. Education given for new homegoing medications with type, uses, and most common side effects. Patient verbalized understanding. Handout on oxygen given. Masimo removed and left at bedside. IV removed. Discharged home in stable condition.
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
## 2025-04-15 NOTE — CARE PLAN
The patient's goals for the shift include  control pain    The clinical goals for the shift include pt pain will be well controlled during shift        Problem: Pain  Goal: Takes deep breaths with improved pain control throughout the shift  Outcome: Progressing     Problem: Pain  Goal: Walks with improved pain control throughout the shift  Outcome: Progressing     Problem: Pain  Goal: Participates in PT with improved pain control throughout the shift  Outcome: Progressing

## 2025-04-16 ENCOUNTER — PATIENT OUTREACH (OUTPATIENT)
Dept: PRIMARY CARE | Facility: CLINIC | Age: 70
End: 2025-04-16
Payer: MEDICARE

## 2025-04-16 ENCOUNTER — SPECIALTY PHARMACY (OUTPATIENT)
Dept: PHARMACY | Facility: CLINIC | Age: 70
End: 2025-04-16

## 2025-04-16 LAB
ATRIAL RATE: 69 BPM
P AXIS: 78 DEGREES
P OFFSET: 190 MS
P ONSET: 137 MS
PR INTERVAL: 180 MS
Q ONSET: 227 MS
QRS COUNT: 11 BEATS
QRS DURATION: 82 MS
QT INTERVAL: 416 MS
QTC CALCULATION(BAZETT): 445 MS
QTC FREDERICIA: 436 MS
R AXIS: 67 DEGREES
T AXIS: 76 DEGREES
T OFFSET: 435 MS
VENTRICULAR RATE: 69 BPM

## 2025-04-16 PROCEDURE — RXMED WILLOW AMBULATORY MEDICATION CHARGE
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
## 2025-04-17 ENCOUNTER — PHARMACY VISIT (OUTPATIENT)
Dept: PHARMACY | Facility: CLINIC | Age: 70
End: 2025-04-17
Payer: MEDICARE
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
## 2025-04-24 ENCOUNTER — APPOINTMENT (OUTPATIENT)
Dept: PRIMARY CARE | Facility: CLINIC | Age: 70
End: 2025-04-24
Payer: MEDICARE

## 2025-04-24 VITALS
SYSTOLIC BLOOD PRESSURE: 114 MMHG | OXYGEN SATURATION: 93 % | BODY MASS INDEX: 24.28 KG/M2 | DIASTOLIC BLOOD PRESSURE: 62 MMHG | WEIGHT: 120.2 LBS | HEART RATE: 76 BPM

## 2025-04-24 DIAGNOSIS — M80.08XD AGE-RELATED OSTEOPOROSIS WITH CURRENT PATHOLOGICAL FRACTURE OF VERTEBRA WITH ROUTINE HEALING, SUBSEQUENT ENCOUNTER: Primary | ICD-10-CM

## 2025-04-24 DIAGNOSIS — M62.81 MUSCLE WEAKNESS: ICD-10-CM

## 2025-04-24 DIAGNOSIS — R26.9 ABNORMAL GAIT: ICD-10-CM

## 2025-04-24 DIAGNOSIS — F32.1 MODERATE SINGLE CURRENT EPISODE OF MAJOR DEPRESSIVE DISORDER (MULTI): ICD-10-CM

## 2025-04-24 DIAGNOSIS — R07.89 MUSCULOSKELETAL CHEST PAIN: ICD-10-CM

## 2025-04-24 PROCEDURE — G2211 COMPLEX E/M VISIT ADD ON: HCPCS | Performed by: FAMILY MEDICINE

## 2025-04-24 PROCEDURE — 1159F MED LIST DOCD IN RCRD: CPT | Performed by: FAMILY MEDICINE

## 2025-04-24 PROCEDURE — 1123F ACP DISCUSS/DSCN MKR DOCD: CPT | Performed by: FAMILY MEDICINE

## 2025-04-24 PROCEDURE — 1111F DSCHRG MED/CURRENT MED MERGE: CPT | Performed by: FAMILY MEDICINE

## 2025-04-24 PROCEDURE — 99214 OFFICE O/P EST MOD 30 MIN: CPT | Performed by: FAMILY MEDICINE

## 2025-04-24 PROCEDURE — 1160F RVW MEDS BY RX/DR IN RCRD: CPT | Performed by: FAMILY MEDICINE

## 2025-04-24 RX ORDER — CALCITONIN SALMON 200 [IU]/.09ML
1 SPRAY, METERED NASAL DAILY
Qty: 3.7 ML | Refills: 0 | Status: SHIPPED | OUTPATIENT
Start: 2025-04-24 | End: 2026-04-24

## 2025-04-24 RX ORDER — PREDNISONE 10 MG/1
TABLET ORAL
Qty: 10 TABLET | Refills: 0 | Status: SHIPPED | OUTPATIENT
Start: 2025-04-24

## 2025-04-24 ASSESSMENT — ENCOUNTER SYMPTOMS
UNEXPECTED WEIGHT CHANGE: 0
NAUSEA: 0
APPETITE CHANGE: 0
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
## 2025-04-29 DIAGNOSIS — J44.1 COPD EXACERBATION (MULTI): ICD-10-CM

## 2025-04-29 DIAGNOSIS — J43.1 PANLOBULAR EMPHYSEMA (MULTI): Primary | ICD-10-CM

## 2025-04-29 NOTE — PROGRESS NOTES
I reviewed the progress note and agree with the resident’s findings and plans as written. Case discussed with resident.    Ivon San, PharmD

## 2025-05-01 ENCOUNTER — PATIENT OUTREACH (OUTPATIENT)
Dept: PRIMARY CARE | Facility: CLINIC | Age: 70
End: 2025-05-01
Payer: MEDICARE

## 2025-05-01 NOTE — PROGRESS NOTES
Unable to reach patient for follow up call after recent hospitalization.   Left voicemail with call back number for patient to call if needed   If no voicemail available call attempts x 2 were made to contact the patient to assist with any questions or concerns patient may have.    Office Visit with Yaniv Whitehead MD (04/24/2025)

## 2025-05-02 ENCOUNTER — TELEMEDICINE (OUTPATIENT)
Dept: PHARMACY | Facility: HOSPITAL | Age: 70
End: 2025-05-02
Payer: MEDICARE

## 2025-05-02 DIAGNOSIS — J44.1 COPD EXACERBATION (MULTI): ICD-10-CM

## 2025-05-02 DIAGNOSIS — J43.1 PANLOBULAR EMPHYSEMA (MULTI): ICD-10-CM

## 2025-05-02 RX ORDER — ALBUTEROL SULFATE 90 UG/1
2 INHALANT RESPIRATORY (INHALATION) EVERY 6 HOURS PRN
Qty: 18 G | Refills: 0 | Status: SHIPPED | OUTPATIENT
Start: 2025-05-02 | End: 2026-05-02
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
## 2025-05-02 NOTE — Clinical Note
No COPD concerns at this time. Sent in albuterol Rx so Rebekah has an in-date Rx on hand if needed.

## 2025-05-05 PROCEDURE — RXMED WILLOW AMBULATORY MEDICATION CHARGE

## 2025-05-07 ENCOUNTER — PHARMACY VISIT (OUTPATIENT)
Dept: PHARMACY | Facility: CLINIC | Age: 70
End: 2025-05-07
Payer: MEDICARE

## 2025-05-12 ENCOUNTER — SPECIALTY PHARMACY (OUTPATIENT)
Dept: PHARMACY | Facility: CLINIC | Age: 70
End: 2025-05-12

## 2025-05-12 PROCEDURE — RXMED WILLOW AMBULATORY MEDICATION CHARGE

## 2025-05-15 ENCOUNTER — PHARMACY VISIT (OUTPATIENT)
Dept: PHARMACY | Facility: CLINIC | Age: 70
End: 2025-05-15
Payer: MEDICARE

## 2025-05-16 ENCOUNTER — PATIENT OUTREACH (OUTPATIENT)
Dept: PRIMARY CARE | Facility: CLINIC | Age: 70
End: 2025-05-16
Payer: MEDICARE

## 2025-06-06 DIAGNOSIS — M80.00XA AGE-RELATED OSTEOPOROSIS WITH CURRENT PATHOLOGICAL FRACTURE, INITIAL ENCOUNTER: ICD-10-CM

## 2025-06-06 RX ORDER — TERIPARATIDE 250 UG/ML
20 INJECTION, SOLUTION SUBCUTANEOUS DAILY
Qty: 74.4 ML | Refills: 1 | Status: SHIPPED | OUTPATIENT
Start: 2025-06-06 | End: 2025-08-05

## 2025-06-06 NOTE — PROGRESS NOTES
Re-sending updated teriparatide rx to [facility] per pharmacy request. Continuation of therapy , telepharmacy pt

## 2025-06-09 ENCOUNTER — SPECIALTY PHARMACY (OUTPATIENT)
Dept: PHARMACY | Facility: CLINIC | Age: 70
End: 2025-06-09

## 2025-06-10 PROCEDURE — RXMED WILLOW AMBULATORY MEDICATION CHARGE

## 2025-06-12 ENCOUNTER — SPECIALTY PHARMACY (OUTPATIENT)
Dept: PHARMACY | Facility: CLINIC | Age: 70
End: 2025-06-12

## 2025-06-13 ENCOUNTER — PHARMACY VISIT (OUTPATIENT)
Dept: PHARMACY | Facility: CLINIC | Age: 70
End: 2025-06-13
Payer: MEDICARE

## 2025-06-16 ENCOUNTER — APPOINTMENT (OUTPATIENT)
Dept: RHEUMATOLOGY | Facility: CLINIC | Age: 70
End: 2025-06-16
Payer: MEDICARE

## 2025-06-16 VITALS
DIASTOLIC BLOOD PRESSURE: 74 MMHG | SYSTOLIC BLOOD PRESSURE: 112 MMHG | BODY MASS INDEX: 24.2 KG/M2 | OXYGEN SATURATION: 95 % | WEIGHT: 119.8 LBS | HEART RATE: 62 BPM

## 2025-06-16 DIAGNOSIS — M81.0 OSTEOPOROSIS: ICD-10-CM

## 2025-06-16 DIAGNOSIS — M80.00XA AGE-RELATED OSTEOPOROSIS WITH CURRENT PATHOLOGICAL FRACTURE, INITIAL ENCOUNTER: ICD-10-CM

## 2025-06-16 DIAGNOSIS — Z79.899 ENCOUNTER FOR MONITORING TERIPARATIDE THERAPY: Primary | ICD-10-CM

## 2025-06-16 DIAGNOSIS — Z51.81 ENCOUNTER FOR MONITORING TERIPARATIDE THERAPY: Primary | ICD-10-CM

## 2025-06-16 PROCEDURE — 1125F AMNT PAIN NOTED PAIN PRSNT: CPT | Performed by: INTERNAL MEDICINE

## 2025-06-16 PROCEDURE — 99213 OFFICE O/P EST LOW 20 MIN: CPT | Performed by: INTERNAL MEDICINE

## 2025-06-16 PROCEDURE — G2211 COMPLEX E/M VISIT ADD ON: HCPCS | Performed by: INTERNAL MEDICINE

## 2025-06-16 PROCEDURE — 1036F TOBACCO NON-USER: CPT | Performed by: INTERNAL MEDICINE

## 2025-06-16 PROCEDURE — 1159F MED LIST DOCD IN RCRD: CPT | Performed by: INTERNAL MEDICINE

## 2025-06-16 ASSESSMENT — ENCOUNTER SYMPTOMS
FATIGUE: 1
ABDOMINAL PAIN: 0
BACK PAIN: 1
DEPRESSION: 1
SHORTNESS OF BREATH: 1

## 2025-06-16 ASSESSMENT — PATIENT HEALTH QUESTIONNAIRE - PHQ9
1. LITTLE INTEREST OR PLEASURE IN DOING THINGS: SEVERAL DAYS
SUM OF ALL RESPONSES TO PHQ9 QUESTIONS 1 AND 2: 2
2. FEELING DOWN, DEPRESSED OR HOPELESS: SEVERAL DAYS

## 2025-06-16 ASSESSMENT — PAIN SCALES - GENERAL: PAINLEVEL_OUTOF10: 8
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
## 2025-07-02 ENCOUNTER — TELEMEDICINE CLINICAL SUPPORT (OUTPATIENT)
Dept: PHARMACY | Facility: HOSPITAL | Age: 70
End: 2025-07-02
Payer: MEDICARE

## 2025-07-02 DIAGNOSIS — M80.00XA AGE-RELATED OSTEOPOROSIS WITH CURRENT PATHOLOGICAL FRACTURE, INITIAL ENCOUNTER: ICD-10-CM
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
## 2025-07-03 PROCEDURE — RXMED WILLOW AMBULATORY MEDICATION CHARGE

## 2025-07-03 RX ORDER — TERIPARATIDE 250 UG/ML
20 INJECTION, SOLUTION SUBCUTANEOUS DAILY
Qty: 2.48 ML | Refills: 4 | Status: SHIPPED | OUTPATIENT
Start: 2025-07-03

## 2025-07-07 ENCOUNTER — PHARMACY VISIT (OUTPATIENT)
Dept: PHARMACY | Facility: CLINIC | Age: 70
End: 2025-07-07
Payer: MEDICARE

## 2025-07-30 DIAGNOSIS — M80.00XA AGE-RELATED OSTEOPOROSIS WITH CURRENT PATHOLOGICAL FRACTURE, INITIAL ENCOUNTER: Primary | ICD-10-CM

## 2025-07-30 RX ORDER — TERIPARATIDE 250 UG/ML
20 INJECTION, SOLUTION SUBCUTANEOUS DAILY
Qty: 1 EACH | Refills: 3 | Status: SHIPPED | OUTPATIENT
Start: 2025-07-30

## 2025-08-04 ENCOUNTER — SPECIALTY PHARMACY (OUTPATIENT)
Dept: PHARMACY | Facility: CLINIC | Age: 70
End: 2025-08-04

## 2025-08-04 PROCEDURE — RXMED WILLOW AMBULATORY MEDICATION CHARGE

## 2025-08-07 ENCOUNTER — PHARMACY VISIT (OUTPATIENT)
Dept: PHARMACY | Facility: CLINIC | Age: 70
End: 2025-08-07
Payer: MEDICARE

## 2025-08-28 ENCOUNTER — SPECIALTY PHARMACY (OUTPATIENT)
Dept: PHARMACY | Facility: CLINIC | Age: 70
End: 2025-08-28

## 2025-08-28 PROCEDURE — RXMED WILLOW AMBULATORY MEDICATION CHARGE

## 2025-09-02 ENCOUNTER — PHARMACY VISIT (OUTPATIENT)
Dept: PHARMACY | Facility: CLINIC | Age: 70
End: 2025-09-02
Payer: MEDICARE

## 2025-12-03 ENCOUNTER — APPOINTMENT (OUTPATIENT)
Facility: CLINIC | Age: 70
End: 2025-12-03
Payer: MEDICARE

## 2026-02-19 ENCOUNTER — APPOINTMENT (OUTPATIENT)
Dept: RHEUMATOLOGY | Facility: CLINIC | Age: 71
End: 2026-02-19
Payer: MEDICARE

## (undated) DEVICE — GLOVE, SURGICAL, PROTEXIS PI BLUE W/NEUTHERA, 7.5, PF, LF

## (undated) DEVICE — Device

## (undated) DEVICE — SPONGE GAUZE, XRAY RFD, 8X4 12 PLY

## (undated) DEVICE — SOLUTION, IRRIGATION, STERILE WATER, 1000 ML, POUR BOTTLE

## (undated) DEVICE — DRAPE PACK, BASIC VI, AURORA, 50 X 90IN

## (undated) DEVICE — DRAPE, SHEET, LAPAROTOMY, W/ISO-BAC, W/ARMBOARD COVERS, 98 X 122 IN, DISPOSABLE, LF, STERILE

## (undated) DEVICE — STRIP, SKIN CLOSURE, COMPOUND BENZION TINCTURE 0.6ML

## (undated) DEVICE — BLOCK, FOAM, NEEDLE POP -N-COUNT, 1840, BLACK

## (undated) DEVICE — MARKER, SURGICAL, SKIN, REG TIP, W/ RULER & LABELS

## (undated) DEVICE — NEEDLE, SPINAL, 22 G X 3.5 IN, BLACK HUB

## (undated) DEVICE — APPLICATOR, CHLORAPREP, W/ORANGE TINT, 26ML

## (undated) DEVICE — SLEEVE, VASO PRESS, CALF GARMENT, MEDIUM, GREEN

## (undated) DEVICE — DRAPE, SHEET, FAN FOLDED, HALF, 44 X 58 IN, DISPOSABLE, LF, STERILE

## (undated) DEVICE — STRIP, SKIN CLOSURE, STERI STRIP, REINFORCED, 0.5 X 4 IN

## (undated) DEVICE — SOLUTION, IRRIGATION, SODIUM CHLORIDE 0.9%, 1000 ML, POUR BOTTLE

## (undated) DEVICE — GLOVE, SURGICAL, PROTEXIS PI , 7.0, PF, LF

## (undated) DEVICE — TOWEL PACK, STERILE, 4/PACK, BLUE

## (undated) DEVICE — DRAPE, C-ARMOR KIT

## (undated) DEVICE — DRAPE, SHEET, UTILITY, NON ABSORBENT, 18 X 26 IN, LF